# Patient Record
Sex: FEMALE | Race: WHITE | NOT HISPANIC OR LATINO | Employment: UNEMPLOYED | URBAN - METROPOLITAN AREA
[De-identification: names, ages, dates, MRNs, and addresses within clinical notes are randomized per-mention and may not be internally consistent; named-entity substitution may affect disease eponyms.]

---

## 2018-02-08 ENCOUNTER — TRANSCRIBE ORDERS (OUTPATIENT)
Dept: ADMINISTRATIVE | Facility: HOSPITAL | Age: 33
End: 2018-02-08

## 2018-02-08 ENCOUNTER — LAB (OUTPATIENT)
Dept: LAB | Facility: HOSPITAL | Age: 33
End: 2018-02-08
Attending: SURGERY

## 2018-02-08 DIAGNOSIS — D68.59 HYPERCOAGULOPATHY (HCC): ICD-10-CM

## 2018-02-08 DIAGNOSIS — Z01.818 PRE-OP TESTING: ICD-10-CM

## 2018-02-08 DIAGNOSIS — D68.59 HYPERCOAGULOPATHY (HCC): Primary | ICD-10-CM

## 2018-02-08 LAB
APTT PPP: 30.2 SECONDS (ref 22.7–35.4)
CHROMATIN AB SERPL-ACNC: <10 IU/ML (ref 0–14)
ERYTHROCYTE [SEDIMENTATION RATE] IN BLOOD: 33 MM/HR (ref 0–20)
FIBRINOGEN PPP-MCNC: 474 MG/DL (ref 219–464)
INR PPP: 1.02 (ref 0.9–1.1)
PROTHROMBIN TIME: 13.2 SECONDS (ref 11.7–14.2)

## 2018-02-08 PROCEDURE — 86148 ANTI-PHOSPHOLIPID ANTIBODY: CPT

## 2018-02-08 PROCEDURE — 86147 CARDIOLIPIN ANTIBODY EA IG: CPT

## 2018-02-08 PROCEDURE — 85300 ANTITHROMBIN III ACTIVITY: CPT

## 2018-02-08 PROCEDURE — 85732 THROMBOPLASTIN TIME PARTIAL: CPT

## 2018-02-08 PROCEDURE — 36415 COLL VENOUS BLD VENIPUNCTURE: CPT

## 2018-02-08 PROCEDURE — 85306 CLOT INHIBIT PROT S FREE: CPT

## 2018-02-08 PROCEDURE — 85384 FIBRINOGEN ACTIVITY: CPT

## 2018-02-08 PROCEDURE — 86431 RHEUMATOID FACTOR QUANT: CPT

## 2018-02-08 PROCEDURE — 85613 RUSSELL VIPER VENOM DILUTED: CPT

## 2018-02-08 PROCEDURE — 85303 CLOT INHIBIT PROT C ACTIVITY: CPT

## 2018-02-08 PROCEDURE — 83090 ASSAY OF HOMOCYSTEINE: CPT

## 2018-02-08 PROCEDURE — 86146 BETA-2 GLYCOPROTEIN ANTIBODY: CPT

## 2018-02-08 PROCEDURE — 85705 THROMBOPLASTIN INHIBITION: CPT

## 2018-02-08 PROCEDURE — 81240 F2 GENE: CPT

## 2018-02-08 PROCEDURE — 85730 THROMBOPLASTIN TIME PARTIAL: CPT

## 2018-02-08 PROCEDURE — 85670 THROMBIN TIME PLASMA: CPT

## 2018-02-08 PROCEDURE — 85305 CLOT INHIBIT PROT S TOTAL: CPT

## 2018-02-08 PROCEDURE — 81241 F5 GENE: CPT

## 2018-02-08 PROCEDURE — 85610 PROTHROMBIN TIME: CPT

## 2018-02-08 PROCEDURE — 86038 ANTINUCLEAR ANTIBODIES: CPT

## 2018-02-08 PROCEDURE — 85652 RBC SED RATE AUTOMATED: CPT

## 2018-02-09 LAB
ANA PAT SER-IMP: ABNORMAL
ANA SER QL: ABNORMAL
AT III PPP CHRO-ACNC: 105 % (ref 90–134)
CARDIOLIPIN IGA SER IA-ACNC: <9 APL U/ML (ref 0–11)
CARDIOLIPIN IGG SER IA-ACNC: <9 GPL U/ML (ref 0–14)
CARDIOLIPIN IGM SER IA-ACNC: 22 MPL U/ML (ref 0–12)
HCYS SERPL-SCNC: 7.5 UMOL/L (ref 0–15)
LA NT DPL PPP: 38.9 SEC (ref 0–55)
LA NT DPL/LA NT HPL PPP-RTO: 0.96 RATIO (ref 0–1.4)
LA NT PLATELET PPP: 34.1 SEC (ref 0–51.9)
LUPUS ANTICOAGULANT REFLEX: NORMAL
PROT C PPP-ACNC: 151 % (ref 86–163)
PROT S ACT/NOR PPP: 90 % (ref 70–127)
PROT S FREE PPP-ACNC: 64 % (ref 57–157)
PROT S PPP-ACNC: 82 % (ref 60–150)
SCREEN DRVVT: 31.6 SEC (ref 0–47)
THROMBIN TIME: 16.5 SEC (ref 0–23)

## 2018-02-11 LAB
PS IGA SER-ACNC: 1 APS IGA (ref 0–20)
PS IGG SER-ACNC: 2 GPS IGG (ref 0–11)
PS IGM SER-ACNC: 38 MPS IGM (ref 0–25)

## 2018-02-12 LAB
B2 GLYCOPROT1 IGA SER-ACNC: <9 GPI IGA UNITS (ref 0–25)
B2 GLYCOPROT1 IGG SER-ACNC: <9 GPI IGG UNITS (ref 0–20)
B2 GLYCOPROT1 IGM SER-ACNC: 12 GPI IGM UNITS (ref 0–32)
F5 GENE MUT ANL BLD/T: NORMAL
FACTOR V COMMENT: NORMAL

## 2018-02-13 LAB
F2 GENE MUT ANL BLD/T: NORMAL
Lab: NORMAL

## 2018-03-14 ENCOUNTER — CONSULT (OUTPATIENT)
Dept: ONCOLOGY | Facility: CLINIC | Age: 33
End: 2018-03-14

## 2018-03-14 ENCOUNTER — APPOINTMENT (OUTPATIENT)
Dept: OTHER | Facility: HOSPITAL | Age: 33
End: 2018-03-14

## 2018-03-14 VITALS
HEART RATE: 80 BPM | TEMPERATURE: 98.5 F | BODY MASS INDEX: 41.19 KG/M2 | RESPIRATION RATE: 16 BRPM | DIASTOLIC BLOOD PRESSURE: 82 MMHG | SYSTOLIC BLOOD PRESSURE: 120 MMHG | OXYGEN SATURATION: 100 % | HEIGHT: 64 IN | WEIGHT: 241.3 LBS

## 2018-03-14 DIAGNOSIS — R89.9 ABNORMAL LABORATORY TEST: Primary | ICD-10-CM

## 2018-03-14 DIAGNOSIS — D68.61 ANTIPHOSPHOLIPID ANTIBODY WITH HYPERCOAGULABLE STATE (HCC): ICD-10-CM

## 2018-03-14 DIAGNOSIS — I82.501 LEG DVT (DEEP VENOUS THROMBOEMBOLISM), CHRONIC, RIGHT (HCC): Primary | ICD-10-CM

## 2018-03-14 LAB
BASOPHILS # BLD AUTO: 0.06 10*3/MM3 (ref 0–0.2)
BASOPHILS NFR BLD AUTO: 0.7 % (ref 0–1.5)
DEPRECATED RDW RBC AUTO: 39.3 FL (ref 37–54)
EOSINOPHIL # BLD AUTO: 0.09 10*3/MM3 (ref 0–0.7)
EOSINOPHIL NFR BLD AUTO: 1 % (ref 0.3–6.2)
ERYTHROCYTE [DISTWIDTH] IN BLOOD BY AUTOMATED COUNT: 12.8 % (ref 11.7–13)
HCT VFR BLD AUTO: 38.8 % (ref 35.6–45.5)
HGB BLD-MCNC: 13.2 G/DL (ref 11.9–15.5)
IMM GRANULOCYTES # BLD: 0.11 10*3/MM3 (ref 0–0.03)
IMM GRANULOCYTES NFR BLD: 1.3 % (ref 0–0.5)
LYMPHOCYTES # BLD AUTO: 2.18 10*3/MM3 (ref 0.9–4.8)
LYMPHOCYTES NFR BLD AUTO: 25.2 % (ref 19.6–45.3)
MCH RBC QN AUTO: 28.9 PG (ref 26.9–32)
MCHC RBC AUTO-ENTMCNC: 34 G/DL (ref 32.4–36.3)
MCV RBC AUTO: 85.1 FL (ref 80.5–98.2)
MONOCYTES # BLD AUTO: 0.66 10*3/MM3 (ref 0.2–1.2)
MONOCYTES NFR BLD AUTO: 7.6 % (ref 5–12)
NEUTROPHILS # BLD AUTO: 5.56 10*3/MM3 (ref 1.9–8.1)
NEUTROPHILS NFR BLD AUTO: 64.2 % (ref 42.7–76)
NRBC BLD MANUAL-RTO: 0 /100 WBC (ref 0–0)
PLATELET # BLD AUTO: 253 10*3/MM3 (ref 140–500)
PMV BLD AUTO: 10.3 FL (ref 6–12)
RBC # BLD AUTO: 4.56 10*6/MM3 (ref 3.9–5.2)
WBC NRBC COR # BLD: 8.66 10*3/MM3 (ref 4.5–10.7)

## 2018-03-14 PROCEDURE — 99205 OFFICE O/P NEW HI 60 MIN: CPT | Performed by: INTERNAL MEDICINE

## 2018-03-14 PROCEDURE — 85025 COMPLETE CBC W/AUTO DIFF WBC: CPT | Performed by: INTERNAL MEDICINE

## 2018-03-14 PROCEDURE — 36415 COLL VENOUS BLD VENIPUNCTURE: CPT

## 2018-03-14 RX ORDER — MONTELUKAST SODIUM 4 MG/1
TABLET, CHEWABLE ORAL
COMMUNITY
Start: 2017-10-26 | End: 2018-06-12

## 2018-03-14 RX ORDER — SUCRALFATE 1 G/1
1 TABLET ORAL 4 TIMES DAILY
COMMUNITY
End: 2018-06-12

## 2018-03-14 RX ORDER — CHOLECALCIFEROL (VITAMIN D3) 50 MCG
2000 TABLET ORAL
COMMUNITY
Start: 2017-06-21 | End: 2018-06-12

## 2018-03-14 RX ORDER — DICYCLOMINE HYDROCHLORIDE 10 MG/1
10 CAPSULE ORAL
COMMUNITY
Start: 2017-06-12 | End: 2018-06-12

## 2018-03-14 RX ORDER — PANTOPRAZOLE SODIUM 40 MG/1
40 TABLET, DELAYED RELEASE ORAL
COMMUNITY
Start: 2018-03-05 | End: 2018-06-12

## 2018-03-14 RX ORDER — HYDROXYZINE HYDROCHLORIDE 25 MG/1
25 TABLET, FILM COATED ORAL
COMMUNITY
Start: 2017-08-29 | End: 2018-06-12

## 2018-03-14 NOTE — PROGRESS NOTES
.     REASON FOR CONSULTATION:     Provide an opinion on any further workup or treatment                             REQUESTING PHYSICIAN: Donovan Calderon MD     RECORDS OBTAINED:  Records of the patients history including those obtained from the referring provider were reviewed and summarized in detail.    HISTORY OF PRESENT ILLNESS:  The patient is a 32 y.o. year old female who is here for initial evaluation because of the newly discovered chronic DVT at the right external iliac vein and abnormal laboratory study for anti-phospholipid antibodies patient has a history of acute DVT 4 years ago when she was pregnant.       This patient has ambiguous history of lower extremity DVT. According to patient, 4 years ago when she had 4th pregnancy, she had preeclampsia and her pregnancy was terminated at 32 weeks with early delivery. She did not have previous episodes of preeclampsia for the previous 3 pregnancies. Nevertheless, according to patient, she was tested positive for D-dimer in her serum and was started on heparin. She reports no venous duplex study or CT scan for the angiogram. After childbirth, she was given Coumadin for about 2 months and then stopped. Patient reports she never had really bad leg swelling and no pains in the legs when she was diagnosed of DVT at that point.     Recently patient was seen by surgeon, Dr. Brody and had EGD examination in late 02/2018. 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 Brody is planning to do gastric bypass surgery. This patient is also moderately obese, at least. However, when Dr. Brody learned about history of suspicious DVT, she was referred to vascular surgeon, Donovan Calderon MD for further evaluation.     Dr. Calderon saw patient on 02/08/2018 and obtained venous duplex study that same day. The study reported chronic DVT in right external iliac vein; however, other veins in the right leg had no evidence for thrombosis. The left leg was completely  negative for venous thrombosis in both deep and superficial veins.     Dr. Calderon also obtained laboratory study for hypercoagulable workup on 02/08/2018. The study reported weakly positive anticardiolipin IgM at 22 units/mL and antiphosphatidylserine IgM antibody 38 units/mL but negative for the corresponding IgG and IgA in both antibodies. She was completely negative for the anti-beta-2 glycoprotein 1 antibodies. This patient also had normal antithrombin activity 105%, normal protein S activity 90% and protein C activity 151%. Her serum free protein S antigen was 64% and total protein S antigen was 82%. She had marginally elevated fibrinogen at 474 mg/dL. She was tested negative for lupus anticoagulant. This patient was also tested negative for mutation, factor V Leiden, and factor II.          Past Medical History:   Diagnosis Date   • Anxiety    • Depression    • DVT (deep vein thrombosis) in pregnancy 2013   • GERD (gastroesophageal reflux disease)    • H/O Abnormal Pap smear of cervix     4+ years ago   • Thrombophilia      Past Surgical History:   Procedure Laterality Date   • CHOLECYSTECTOMY     • COLPOSCOPY     • TUBAL ABDOMINAL LIGATION     • WISDOM TOOTH EXTRACTION         HEMATOLOGIC/ONCOLOGIC HISTORY:  (History from previous dates can be found in the separate document.)    MEDICATIONS    Current Outpatient Prescriptions:   •  Cholecalciferol (VITAMIN D) 2000 units tablet, Take 2,000 Units by mouth., Disp: , Rfl:   •  colestipol (COLESTID) 1 g tablet, TAKE 2 TABLETS BY MOUTH TWICE DAILY, Disp: , Rfl:   •  dicyclomine (BENTYL) 10 MG capsule, Take 10 mg by mouth., Disp: , Rfl:   •  hydrOXYzine (ATARAX) 25 MG tablet, Take 25 mg by mouth., Disp: , Rfl:   •  pantoprazole (PROTONIX) 40 MG EC tablet, Take 40 mg by mouth., Disp: , Rfl:   •  sucralfate (CARAFATE) 1 g tablet, Take 1 g by mouth 4 (Four) Times a Day., Disp: , Rfl:   •  apixaban (ELIQUIS) 2.5 MG tablet tablet, Take 1 tablet by mouth Every 12 (Twelve)  Hours., Disp: 60 tablet, Rfl: 11    ALLERGIES:     Allergies   Allergen Reactions   • Ciprofloxacin Shortness Of Breath and Swelling   • Oxycodone-Acetaminophen Itching, Rash, Shortness Of Breath and Swelling   • Penicillins Rash       SOCIAL HISTORY:       Social History     Social History   • Marital status:      Spouse name: Fritz    • Number of children: 4   • Years of education: High school      Occupational History   • Worked in a [employer].  Previously also worked as a        Social History Main Topics   • Smoking status: Former Smoker     Quit date: 12/12/2016   • Smokeless tobacco: Never Used   • Alcohol use Only occasionally.     • Drug use: No    • Sexual activity: Not on file         FAMILY HISTORY:  Family History   Problem Relation Age of Onset   • Breast cancer Maternal Grandmother    • Depression Maternal Grandmother    • Heart disease Maternal Grandmother    • Cancer Maternal Grandfather    • Brain cancer Paternal Grandmother    • Breast cancer Paternal Grandmother        REVIEW OF SYSTEMS:  Review of Systems   Constitutional: Positive for fatigue. Negative for chills and fever.        Excessive fatigue and night sweats no fever.  Patient complains weight gains and excessive thirst.   HENT: Negative for congestion, hearing loss, rhinorrhea, sore throat and trouble swallowing.    Eyes: Negative for discharge.        Poor vision   Respiratory: Positive for shortness of breath. Negative for cough, chest tightness and wheezing.         Exertional dyspnea   Cardiovascular: Positive for chest pain, palpitations and leg swelling.   Gastrointestinal: Positive for abdominal pain, diarrhea, nausea and rectal pain. Negative for abdominal distention, anal bleeding, blood in stool and vomiting.   Endocrine: Negative for cold intolerance and heat intolerance.   Genitourinary: Positive for frequency. Negative for dysuria, hematuria, menstrual problem, urgency and vaginal bleeding.  "  Musculoskeletal: Positive for arthralgias. Negative for back pain.   Skin: Negative.    Neurological: Positive for dizziness and headaches. Negative for syncope.   Hematological: Negative for adenopathy. Does not bruise/bleed easily.   Psychiatric/Behavioral: Negative for behavioral problems, hallucinations and sleep disturbance.              Vitals:    03/14/18 1511   BP: 120/82   Pulse: 80   Resp: 16   Temp: 98.5 °F (36.9 °C)   TempSrc: Oral   SpO2: 100%   Weight: 109 kg (241 lb 4.8 oz)   Height: 163 cm (64.17\")   PainSc: 10-Worst pain ever   PainLoc: Head     Current Status 3/14/2018   ECOG score 0      PHYSICAL EXAM:      CONSTITUTIONALWell-developed, morbid obese female BMI 41.2  No distress, looks comfortable.  EYES:  Conjunctiva and lids unremarkable.  PERRLA  EARS,NOSE,MOUTH,THROAT:  Ears and nose appear unremarkable.  Lips and gums appear unremarkable.  RESPIRATORY:  Normal respiratory effort.  Lungs clear to auscultation bilaterally.  CARDIOVASCULAR:  Normal S1, S2.  No murmurs rubs or gallops.  No significant lower extremity edema.  GASTROINTESTINAL: obese, Abdomen appears unremarkable.  Nontender.  No hepatomegaly.  No splenomegaly.  LYMPHATIC:  No cervical, supraclavicular, axillary lymphadenopathy.   MUSCULOSKELETAL:  Unremarkable gait.  Unremarkable digits/nails.  No cyanosis or clubbing.  Some discomfort at the right groin area, however no lymphadenopathy.  SKIN:  Warm.  No rashes.  Patient has tattoos all over the body.    PSYCHIATRIC:  Normal judgment and insight.  Normal mood and affect.      RECENT LABS:        WBC   Date Value Ref Range Status   03/14/2018 8.66 4.50 - 10.70 10*3/mm3 Final     Hemoglobin   Date Value Ref Range Status   03/14/2018 13.2 11.9 - 15.5 g/dL Final     Platelets   Date Value Ref Range Status   03/14/2018 253 140 - 500 10*3/mm3 Final       Assessment/Plan      Right Leg chronic DVT (deep venous thromboembolism) in the external iliac vein discovered on 2/8/2018.  With " her history, I would rather believe she had acute DVT 4 years ago during the third trimester of her pregnancy.  She had preeclampsia at that time and had a early delivery at a 32 week gestation age.  However she did not have venous Doppler study at that time according to patient.  He was in the hospital in [place].  Nevertheless she was given a fraction a heparin and then subsequently switched her to Coumadin anticoagulation for 2 more months.      Venous Doppler study this time on 2/8/2018 reported chronic DVT in the right external iliac vein.  She also had laboratory study reported slightly elevated anticardiolipin IgM and also anti-phosphatidylserine IgM antibodies but no other antibodies in the antiphospholipid antibody family.  She had normal protein C and protein S activity, no evidence of mutation for both factor V Leiden and factor II.  I discussed with patient that we'll repeat her laboratory study for the anti-cardiolipin antibodies and also anti-phosphatidylserine antibodies in 3 months.  Nevertheless I do recommend starting low-dose Eliquis 2.5 mg every 12 hours.  This will be on hold 48 hours prior to her surgical procedure.      I agree with Dr. Calderon's assessment that the IVC filter even it is to be placed, it will be a temporary measure.  Need to be removed, otherwise it becomes thrombogenic in the long run and will be harmful for this patient.        PLAN:   1.  Start low-dose Eliquis 2.5 mg every 12 hours.  I e-scribed to her pharmacy.  Eliquis needs to be hold 48 hours prior to surgical procedure.  2.  Patient will follow-up with vascular surgeon Dr. Calderon for further vascular study and possible IVC filter placement.   3.  Patient will follow-up with Dr. Brody for gastric bypass.  Could consider short term low-dose Lovenox 40 mg subcutaneous every 12 hours (1 week) for DVT prophylaxis up to one week after bypass surgery because possible nothing by mouth after the surgery.  4.  Repeat laboratory  studies in 3 months as listed below, one week prior to her next M.D. visit here.   - Anticardiolipin Antibody, IgG / M, Qn  - Beta-2 Glycoprotein Antibodies  - Phosphatidylserine Antibodies  - CBC & Differential      FUNMILAYO CAR M.D., Ph.D.    3/14/2018        CC:   MD Conner Alvarez M.D.     Dictated using Dragon Dictation.

## 2018-06-05 ENCOUNTER — LAB (OUTPATIENT)
Dept: LAB | Facility: HOSPITAL | Age: 33
End: 2018-06-05

## 2018-06-05 DIAGNOSIS — I82.501 LEG DVT (DEEP VENOUS THROMBOEMBOLISM), CHRONIC, RIGHT (HCC): ICD-10-CM

## 2018-06-05 LAB
BASOPHILS # BLD AUTO: 0.03 10*3/MM3 (ref 0–0.1)
BASOPHILS NFR BLD AUTO: 0.4 % (ref 0–1.1)
DEPRECATED RDW RBC AUTO: 41.7 FL (ref 37–49)
EOSINOPHIL # BLD AUTO: 0.09 10*3/MM3 (ref 0–0.36)
EOSINOPHIL NFR BLD AUTO: 1.3 % (ref 1–5)
ERYTHROCYTE [DISTWIDTH] IN BLOOD BY AUTOMATED COUNT: 13.4 % (ref 11.7–14.5)
HCT VFR BLD AUTO: 36.2 % (ref 34–45)
HGB BLD-MCNC: 11.4 G/DL (ref 11.5–14.9)
IMM GRANULOCYTES # BLD: 0.03 10*3/MM3 (ref 0–0.03)
IMM GRANULOCYTES NFR BLD: 0.4 % (ref 0–0.5)
LYMPHOCYTES # BLD AUTO: 1.35 10*3/MM3 (ref 1–3.5)
LYMPHOCYTES NFR BLD AUTO: 19.5 % (ref 20–49)
MCH RBC QN AUTO: 27.3 PG (ref 27–33)
MCHC RBC AUTO-ENTMCNC: 31.5 G/DL (ref 32–35)
MCV RBC AUTO: 86.8 FL (ref 83–97)
MONOCYTES # BLD AUTO: 0.61 10*3/MM3 (ref 0.25–0.8)
MONOCYTES NFR BLD AUTO: 8.8 % (ref 4–12)
NEUTROPHILS # BLD AUTO: 4.81 10*3/MM3 (ref 1.5–7)
NEUTROPHILS NFR BLD AUTO: 69.6 % (ref 39–75)
NRBC BLD MANUAL-RTO: 0 /100 WBC (ref 0–0)
PLATELET # BLD AUTO: 184 10*3/MM3 (ref 150–375)
PMV BLD AUTO: 11 FL (ref 8.9–12.1)
RBC # BLD AUTO: 4.17 10*6/MM3 (ref 3.9–5)
WBC NRBC COR # BLD: 6.92 10*3/MM3 (ref 4–10)

## 2018-06-05 PROCEDURE — 36415 COLL VENOUS BLD VENIPUNCTURE: CPT | Performed by: INTERNAL MEDICINE

## 2018-06-05 PROCEDURE — 85025 COMPLETE CBC W/AUTO DIFF WBC: CPT | Performed by: INTERNAL MEDICINE

## 2018-06-06 LAB
CARDIOLIPIN IGG SER IA-ACNC: <9 GPL U/ML (ref 0–14)
CARDIOLIPIN IGM SER IA-ACNC: 20 MPL U/ML (ref 0–12)

## 2018-06-07 LAB
B2 GLYCOPROT1 IGA SER-ACNC: <9 GPI IGA UNITS (ref 0–25)
B2 GLYCOPROT1 IGG SER-ACNC: <9 GPI IGG UNITS (ref 0–20)
B2 GLYCOPROT1 IGM SER-ACNC: 9 GPI IGM UNITS (ref 0–32)
PS IGA SER-ACNC: 1 APS IGA (ref 0–20)
PS IGG SER-ACNC: 3 GPS IGG (ref 0–11)
PS IGM SER-ACNC: 25 MPS IGM (ref 0–25)

## 2018-06-12 ENCOUNTER — OFFICE VISIT (OUTPATIENT)
Dept: ONCOLOGY | Facility: CLINIC | Age: 33
End: 2018-06-12

## 2018-06-12 ENCOUNTER — APPOINTMENT (OUTPATIENT)
Dept: LAB | Facility: HOSPITAL | Age: 33
End: 2018-06-12

## 2018-06-12 VITALS
TEMPERATURE: 98.5 F | DIASTOLIC BLOOD PRESSURE: 70 MMHG | SYSTOLIC BLOOD PRESSURE: 120 MMHG | WEIGHT: 206.8 LBS | RESPIRATION RATE: 16 BRPM | OXYGEN SATURATION: 98 % | HEIGHT: 64 IN | HEART RATE: 78 BPM | BODY MASS INDEX: 35.3 KG/M2

## 2018-06-12 DIAGNOSIS — I82.501 LEG DVT (DEEP VENOUS THROMBOEMBOLISM), CHRONIC, RIGHT (HCC): ICD-10-CM

## 2018-06-12 DIAGNOSIS — D68.61 ANTIPHOSPHOLIPID ANTIBODY WITH HYPERCOAGULABLE STATE (HCC): ICD-10-CM

## 2018-06-12 DIAGNOSIS — D64.9 ANEMIA, UNSPECIFIED TYPE: Primary | ICD-10-CM

## 2018-06-12 PROCEDURE — 99214 OFFICE O/P EST MOD 30 MIN: CPT | Performed by: INTERNAL MEDICINE

## 2018-06-12 PROCEDURE — G0463 HOSPITAL OUTPT CLINIC VISIT: HCPCS | Performed by: INTERNAL MEDICINE

## 2018-06-12 RX ORDER — FERROUS SULFATE 325(65) MG
325 TABLET ORAL
COMMUNITY

## 2018-06-12 RX ORDER — OMEPRAZOLE 40 MG/1
40 CAPSULE, DELAYED RELEASE ORAL DAILY
COMMUNITY

## 2018-06-12 NOTE — PROGRESS NOTES
.     REASON FOR CONSULTATION:     Right Leg chronic DVT (deep venous thromboembolism) in the external iliac vein discovered on 2/8/2018.  Patient is on low-dose Eliquis 2.5 mg twice a day.                                   HISTORY OF PRESENT ILLNESS:  The patient is a 32 y.o. year old female who is here for 3-month re-evaluation because of chronic DVT at the right external iliac vein and abnormal laboratory study for anti-phospholipid antibodies.   patient has a history of acute DVT in 2014 when she was pregnant.       We saw patient in March 2018 and started her on low-dose Eliquis 2.5 mg every 12 hours.     Patient reports she is doing well, no bleeding no bruising.  Her repeated a venous Doppler study reported resolution of thrombosis on 4/5/2018.  I reviewed Dr. Calderon's office note.    Patient also reports she had gastric bypass surgery done on May 1, 2018.     We repeated laboratory study on 6/5/2018, she still had a marginally elevated anticardiolipin IgM antibody but negative for the IgG and IgA subtype, and she was also tested negative for anti-phosphatidylserine IgM antibody this time, as well as IgG and IgA subtype, and also negative for anti-beta-2 glycoprotein 1 antibodies.    On 6/5/2018, She was mildly anemic with a hemoglobin 11.4.  Has normal platelets 184,000 and WBC 6900 including neutrophils 4800 lymphocytes 1300.     Past Medical History:   Diagnosis Date   • Anemia    • Anxiety    • Depression    • DVT (deep vein thrombosis) in pregnancy 2013   • GERD (gastroesophageal reflux disease)    • H/O Abnormal Pap smear of cervix     4+ years ago   • Tattoos    • Thrombophilia      Past Surgical History:   Procedure Laterality Date   • CHOLECYSTECTOMY  2009   • COLPOSCOPY     • TUBAL ABDOMINAL LIGATION     • WISDOM TOOTH EXTRACTION     Gastric bypass in early May 2018.      HEMATOLOGIC/ONCOLOGIC HISTORY:  The patient is a 32 y.o. year old female who is here for initial evaluation because of the  newly discovered chronic DVT at the right external iliac vein and abnormal laboratory study for anti-phospholipid antibodies patient has a history of acute DVT 4 years ago when she was pregnant.       This patient has ambiguous history of lower extremity DVT. According to patient, 4 years ago when she had 4th pregnancy, she had preeclampsia and her pregnancy was terminated at 32 weeks with early delivery. She did not have previous episodes of preeclampsia for the previous 3 pregnancies. Nevertheless, according to patient, she was tested positive for D-dimer in her serum and was started on heparin. She reports no venous duplex study or CT scan for the angiogram. After childbirth, she was given Coumadin for about 2 months and then stopped. Patient reports she never had really bad leg swelling and no pains in the legs when she was diagnosed of DVT at that point.     Recently patient was seen by surgeon, Dr. Brody and had EGD examination in late 02/2018. 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 Brody is planning to do gastric bypass surgery. This patient is also moderately obese, at least. However, when Dr. Brody learned about history of suspicious DVT, she was referred to vascular surgeon, Donovan Calderon MD for further evaluation.     Dr. Calderon saw patient on 02/08/2018 and obtained venous duplex study that same day. The study reported chronic DVT in right external iliac vein; however, other veins in the right leg had no evidence for thrombosis. The left leg was completely negative for venous thrombosis in both deep and superficial veins.     Dr. Calderon also obtained laboratory study for hypercoagulable workup on 02/08/2018. The study reported weakly positive anticardiolipin IgM at 22 units/mL and antiphosphatidylserine IgM antibody 38 units/mL but negative for the corresponding IgG and IgA in both antibodies. She was completely negative for the anti-beta-2 glycoprotein 1 antibodies. This patient also  had normal antithrombin activity 105%, normal protein S activity 90% and protein C activity 151%. Her serum free protein S antigen was 64% and total protein S antigen was 82%. She had marginally elevated fibrinogen at 474 mg/dL. She was tested negative for lupus anticoagulant. This patient was also tested negative for mutation, factor V Leiden, and factor II.    Patient was started on low-dose Eliquis 2.5 mg every 12 hours on 3/14/2018.      MEDICATIONS    Current Outpatient Prescriptions:   •  Calcium 500-100 MG-UNIT chewable tablet, Chew 1 tablet Every 4 (Four) Hours As Needed., Disp: , Rfl:   •  Cyanocobalamin (VITAMIN B-12 IJ), Inject  as directed., Disp: , Rfl:   •  ferrous sulfate 325 (65 FE) MG tablet, Take 325 mg by mouth Daily With Breakfast., Disp: , Rfl:   •  Multiple Vitamin (MULTI VITAMIN DAILY PO), Take  by mouth., Disp: , Rfl:   •  omeprazole (priLOSEC) 40 MG capsule, Take 40 mg by mouth Daily., Disp: , Rfl:   •  apixaban (ELIQUIS) 2.5 MG tablet tablet, Take 1 tablet by mouth Every 12 (Twelve) Hours., Disp: 60 tablet, Rfl: 11    ALLERGIES:     Allergies   Allergen Reactions   • Ciprofloxacin Shortness Of Breath and Swelling   • Oxycodone-Acetaminophen Itching, Rash, Shortness Of Breath and Swelling   • Penicillins Rash       SOCIAL HISTORY:       Social History     Social History   • Marital status:      Spouse name: Fritz    • Number of children: 4   • Years of education: High school      Occupational History   • Worked in a [employer].  Previously also worked as a        Social History Main Topics   • Smoking status: Former Smoker     Quit date: 12/12/2016   • Smokeless tobacco: Never Used   • Alcohol use Only occasionally.     • Drug use: No    • Sexual activity: Not on file         FAMILY HISTORY:  Family History   Problem Relation Age of Onset   • Breast cancer Maternal Grandmother    • Depression Maternal Grandmother    • Heart disease Maternal Grandmother    • Cancer Maternal  "Grandfather    • Brain cancer Paternal Grandmother    • Breast cancer Paternal Grandmother        REVIEW OF SYSTEMS:  Review of Systems   Constitutional: Positive for fatigue. Negative for chills and fever.        Excessive fatigue and night sweats no fever.  Patient complains weight gains and excessive thirst.   HENT: Negative for congestion, hearing loss, rhinorrhea, sore throat and trouble swallowing.    Eyes: Negative for discharge.        Poor vision   Respiratory: Positive for shortness of breath. Negative for cough, chest tightness and wheezing.         Exertional dyspnea   Cardiovascular: Positive for chest pain, palpitations and leg swelling.   Gastrointestinal: Positive for abdominal pain, diarrhea, nausea and rectal pain. Negative for abdominal distention, anal bleeding, blood in stool and vomiting.   Endocrine: Negative for cold intolerance and heat intolerance.   Genitourinary: Positive for frequency. Negative for dysuria, hematuria, menstrual problem, urgency and vaginal bleeding.   Musculoskeletal: Positive for arthralgias. Negative for back pain.   Skin: Negative.    Neurological: Positive for dizziness and headaches. Negative for syncope.   Hematological: Negative for adenopathy. Does not bruise/bleed easily.   Psychiatric/Behavioral: Negative for behavioral problems, hallucinations and sleep disturbance.              Vitals:    06/12/18 1010   BP: 120/70   Pulse: 78   Resp: 16   Temp: 98.5 °F (36.9 °C)   SpO2: 98%  Comment: at rest   Weight: 93.8 kg (206 lb 12.8 oz)   Height: 163 cm (64.17\")   PainSc: 8  Comment: joints     Current Status 6/12/2018   ECOG score 1      PHYSICAL EXAM:      CONSTITUTIONAL: Well-developed,  mild obese female BMI 35.3,  No distress, looks comfortable.  EYES:  Conjunctiva and lids unremarkable.   EARS,NOSE,MOUTH,THROAT:  Ears and nose appear unremarkable.  Lips appear unremarkable.  RESPIRATORY: Lungs clear to auscultation bilaterally.  CARDIOVASCULAR:  Normal S1, S2.  " No murmurs rubs or gallops.    GASTROINTESTINAL:  Nontender.  No hepatomegaly.  No splenomegaly.  Bowel sounds normal.    LYMPHATIC:  No cervical, supraclavicular, axillary lymphadenopathy.   MUSCULOSKELETAL:  Unremarkable gait.  No cyanosis or clubbing.  No significant lower extremity edema.  SKIN:  Warm.  No rashes.  Patient has tattoos all over the body.    PSYCHIATRIC:  Normal judgment and insight.  Normal mood and affect.      RECENT LABS:        WBC   Date Value Ref Range Status   06/05/2018 6.92 4.00 - 10.00 10*3/mm3 Final   03/14/2018 8.66 4.50 - 10.70 10*3/mm3 Final     Hemoglobin   Date Value Ref Range Status   06/05/2018 11.4 (L) 11.5 - 14.9 g/dL Final   03/14/2018 13.2 11.9 - 15.5 g/dL Final     Platelets   Date Value Ref Range Status   06/05/2018 184 150 - 375 10*3/mm3 Final   03/14/2018 253 140 - 500 10*3/mm3 Final       Assessment/Plan      1. Right Leg chronic DVT (deep venous thromboembolism) in the external iliac vein discovered on 2/8/2018.  This patient possibly has antiphospholipid antibody syndrome.  Venous Doppler study this time on 2/8/2018 reported chronic DVT in the right external iliac vein.  She also had laboratory study reported slightly elevated anticardiolipin IgM and also anti-phosphatidylserine IgM antibodies but no other antibodies in the antiphospholipid antibody family.  She had normal protein C and protein S activity, no evidence of mutation for both factor V Leiden and factor II.      We started patient on low-dose Eliquis 2.5 mg every 12 hours in middle March 2018.  She tolerated therapy very well.  This patient had a repeated a venous Doppler study on 4/5/2018 which showed resolution of thrombosis.      She will continue Eliquis for now.     2.  Obesity status post gastric bypass in early May 2018.  Patient already lost about 30 pounds since surgery.  She did not have IVC filter placed.  Patient already has be on vitamin B12 injection.  She also takes oral iron.    3.  Mild  anemia.  Possibly related to her recent surgery.  Nevertheless I think this patient would eventually develop iron deficiency anemia as almost all the patient had gastric bypass.         PLAN:   1.  Continue low-dose Eliquis 2.5 mg every 12 hours.   2.  Patient will return in 3 months for M.D. evaluation, with CBC, iron profile and ferritin level.       FUNMILAYO CAR M.D., Ph.D.    6/12/2018      CC:   MD Conner Alvarez M.D.     Dictated using Dragon Dictation.

## 2018-07-05 ENCOUNTER — TELEPHONE (OUTPATIENT)
Dept: ONCOLOGY | Facility: HOSPITAL | Age: 33
End: 2018-07-05

## 2018-07-05 NOTE — TELEPHONE ENCOUNTER
Received message from [facility] in obstetrics and gynecology.  Message sent to Dr. Cary:    Dr. Cary, patient needs to have a total laparoscopic hysterectomy, bilateral salpingectomy and cystocopy.  They need written recommendations for holding eliquis before and after surgery.  Let me know what you recommend. Thanks.     Will fax to [phone]

## 2018-12-12 ENCOUNTER — APPOINTMENT (OUTPATIENT)
Dept: ONCOLOGY | Facility: CLINIC | Age: 33
End: 2018-12-12

## 2018-12-12 ENCOUNTER — APPOINTMENT (OUTPATIENT)
Dept: LAB | Facility: HOSPITAL | Age: 33
End: 2018-12-12

## 2019-01-25 ENCOUNTER — APPOINTMENT (OUTPATIENT)
Dept: ONCOLOGY | Facility: CLINIC | Age: 34
End: 2019-01-25

## 2019-01-25 ENCOUNTER — APPOINTMENT (OUTPATIENT)
Dept: LAB | Facility: HOSPITAL | Age: 34
End: 2019-01-25

## 2019-01-28 ENCOUNTER — LAB (OUTPATIENT)
Dept: LAB | Facility: HOSPITAL | Age: 34
End: 2019-01-28

## 2019-01-28 ENCOUNTER — OFFICE VISIT (OUTPATIENT)
Dept: ONCOLOGY | Facility: CLINIC | Age: 34
End: 2019-01-28

## 2019-01-28 VITALS
DIASTOLIC BLOOD PRESSURE: 69 MMHG | RESPIRATION RATE: 14 BRPM | HEIGHT: 64 IN | HEART RATE: 64 BPM | OXYGEN SATURATION: 99 % | WEIGHT: 136.7 LBS | TEMPERATURE: 98.6 F | SYSTOLIC BLOOD PRESSURE: 108 MMHG | BODY MASS INDEX: 23.34 KG/M2

## 2019-01-28 DIAGNOSIS — T45.4X5A ADVERSE EFFECT OF IRON, INITIAL ENCOUNTER: ICD-10-CM

## 2019-01-28 DIAGNOSIS — I82.501 LEG DVT (DEEP VENOUS THROMBOEMBOLISM), CHRONIC, RIGHT (HCC): ICD-10-CM

## 2019-01-28 DIAGNOSIS — Z98.84 HISTORY OF GASTRIC BYPASS: ICD-10-CM

## 2019-01-28 DIAGNOSIS — D50.9 IRON DEFICIENCY ANEMIA, UNSPECIFIED IRON DEFICIENCY ANEMIA TYPE: Primary | ICD-10-CM

## 2019-01-28 DIAGNOSIS — R59.9 LYMPH NODE ENLARGEMENT: ICD-10-CM

## 2019-01-28 DIAGNOSIS — D68.61 ANTIPHOSPHOLIPID ANTIBODY WITH HYPERCOAGULABLE STATE (HCC): ICD-10-CM

## 2019-01-28 DIAGNOSIS — D64.9 ANEMIA, UNSPECIFIED TYPE: ICD-10-CM

## 2019-01-28 LAB
ALBUMIN SERPL-MCNC: 4.2 G/DL (ref 3.5–5.2)
ALBUMIN/GLOB SERPL: 1.5 G/DL (ref 1.1–2.4)
ALP SERPL-CCNC: 119 U/L (ref 38–116)
ALT SERPL W P-5'-P-CCNC: 47 U/L (ref 0–33)
ANION GAP SERPL CALCULATED.3IONS-SCNC: 13.7 MMOL/L
AST SERPL-CCNC: 27 U/L (ref 0–32)
BASOPHILS # BLD AUTO: 0.03 10*3/MM3 (ref 0–0.1)
BASOPHILS NFR BLD AUTO: 0.6 % (ref 0–1.1)
BILIRUB SERPL-MCNC: 0.3 MG/DL (ref 0.1–1.2)
BUN BLD-MCNC: 6 MG/DL (ref 6–20)
BUN/CREAT SERPL: 9.7 (ref 7.3–30)
CALCIUM SPEC-SCNC: 9.3 MG/DL (ref 8.5–10.2)
CHLORIDE SERPL-SCNC: 104 MMOL/L (ref 98–107)
CO2 SERPL-SCNC: 21.3 MMOL/L (ref 22–29)
CREAT BLD-MCNC: 0.62 MG/DL (ref 0.6–1.1)
DEPRECATED RDW RBC AUTO: 45.2 FL (ref 37–49)
EOSINOPHIL # BLD AUTO: 0.03 10*3/MM3 (ref 0–0.36)
EOSINOPHIL NFR BLD AUTO: 0.6 % (ref 1–5)
ERYTHROCYTE [DISTWIDTH] IN BLOOD BY AUTOMATED COUNT: 13.2 % (ref 11.7–14.5)
FERRITIN SERPL-MCNC: 22.6 NG/ML (ref 11–207)
GFR SERPL CREATININE-BSD FRML MDRD: 112 ML/MIN/1.73
GLOBULIN UR ELPH-MCNC: 2.8 GM/DL (ref 1.8–3.5)
GLUCOSE BLD-MCNC: 97 MG/DL (ref 74–124)
HCT VFR BLD AUTO: 35.9 % (ref 34–45)
HGB BLD-MCNC: 11.7 G/DL (ref 11.5–14.9)
IMM GRANULOCYTES # BLD AUTO: 0.02 10*3/MM3 (ref 0–0.03)
IMM GRANULOCYTES NFR BLD AUTO: 0.4 % (ref 0–0.5)
IRON 24H UR-MRATE: 92 MCG/DL (ref 37–145)
IRON SATN MFR SERPL: 24 % (ref 14–48)
LDH SERPL-CCNC: 157 U/L (ref 99–259)
LYMPHOCYTES # BLD AUTO: 1.46 10*3/MM3 (ref 1–3.5)
LYMPHOCYTES NFR BLD AUTO: 31.4 % (ref 20–49)
MCH RBC QN AUTO: 30.5 PG (ref 27–33)
MCHC RBC AUTO-ENTMCNC: 32.6 G/DL (ref 32–35)
MCV RBC AUTO: 93.5 FL (ref 83–97)
MONOCYTES # BLD AUTO: 0.42 10*3/MM3 (ref 0.25–0.8)
MONOCYTES NFR BLD AUTO: 9 % (ref 4–12)
NEUTROPHILS # BLD AUTO: 2.69 10*3/MM3 (ref 1.5–7)
NEUTROPHILS NFR BLD AUTO: 58 % (ref 39–75)
NRBC BLD AUTO-RTO: 0 /100 WBC (ref 0–0)
PLATELET # BLD AUTO: 175 10*3/MM3 (ref 150–375)
PMV BLD AUTO: 10.3 FL (ref 8.9–12.1)
POTASSIUM BLD-SCNC: 3.6 MMOL/L (ref 3.5–4.7)
PROT SERPL-MCNC: 7 G/DL (ref 6.3–8)
RBC # BLD AUTO: 3.84 10*6/MM3 (ref 3.9–5)
SODIUM BLD-SCNC: 139 MMOL/L (ref 134–145)
TIBC SERPL-MCNC: 391 MCG/DL (ref 249–505)
TRANSFERRIN SERPL-MCNC: 279 MG/DL (ref 200–360)
WBC NRBC COR # BLD: 4.65 10*3/MM3 (ref 4–10)

## 2019-01-28 PROCEDURE — 80053 COMPREHEN METABOLIC PANEL: CPT | Performed by: INTERNAL MEDICINE

## 2019-01-28 PROCEDURE — 82728 ASSAY OF FERRITIN: CPT | Performed by: INTERNAL MEDICINE

## 2019-01-28 PROCEDURE — 83540 ASSAY OF IRON: CPT | Performed by: INTERNAL MEDICINE

## 2019-01-28 PROCEDURE — 99215 OFFICE O/P EST HI 40 MIN: CPT | Performed by: INTERNAL MEDICINE

## 2019-01-28 PROCEDURE — 36415 COLL VENOUS BLD VENIPUNCTURE: CPT | Performed by: INTERNAL MEDICINE

## 2019-01-28 PROCEDURE — 84466 ASSAY OF TRANSFERRIN: CPT | Performed by: INTERNAL MEDICINE

## 2019-01-28 PROCEDURE — 85025 COMPLETE CBC W/AUTO DIFF WBC: CPT | Performed by: INTERNAL MEDICINE

## 2019-01-28 PROCEDURE — 83615 LACTATE (LD) (LDH) ENZYME: CPT | Performed by: INTERNAL MEDICINE

## 2019-01-31 ENCOUNTER — HOSPITAL ENCOUNTER (OUTPATIENT)
Dept: PET IMAGING | Facility: HOSPITAL | Age: 34
Discharge: HOME | End: 2019-01-31
Attending: INTERNAL MEDICINE | Admitting: INTERNAL MEDICINE

## 2019-01-31 DIAGNOSIS — R59.9 LYMPH NODE ENLARGEMENT: ICD-10-CM

## 2019-01-31 LAB — CREAT BLDA-MCNC: 0.7 MG/DL (ref 0.6–1.3)

## 2019-01-31 PROCEDURE — 74177 CT ABD & PELVIS W/CONTRAST: CPT

## 2019-01-31 PROCEDURE — 0 DIATRIZOATE MEGLUMINE & SODIUM PER 1 ML: Performed by: INTERNAL MEDICINE

## 2019-01-31 PROCEDURE — 70491 CT SOFT TISSUE NECK W/DYE: CPT

## 2019-01-31 PROCEDURE — 71260 CT THORAX DX C+: CPT

## 2019-01-31 PROCEDURE — 25010000002 IOPAMIDOL 61 % SOLUTION: Performed by: INTERNAL MEDICINE

## 2019-01-31 PROCEDURE — 82565 ASSAY OF CREATININE: CPT

## 2019-01-31 RX ADMIN — DIATRIZOATE MEGLUMINE AND DIATRIZOATE SODIUM 30 ML: 660; 100 LIQUID ORAL; RECTAL at 08:07

## 2019-01-31 RX ADMIN — IOPAMIDOL 85 ML: 612 INJECTION, SOLUTION INTRAVENOUS at 08:54

## 2019-02-06 RX ORDER — PROCHLORPERAZINE MALEATE 10 MG
10 TABLET ORAL ONCE
Status: CANCELLED
Start: 2019-02-08 | End: 2019-02-08

## 2019-02-06 RX ORDER — PROCHLORPERAZINE MALEATE 10 MG
10 TABLET ORAL ONCE
Status: CANCELLED
Start: 2019-02-15 | End: 2019-02-15

## 2019-02-06 RX ORDER — SODIUM CHLORIDE 9 MG/ML
250 INJECTION, SOLUTION INTRAVENOUS ONCE
Status: CANCELLED | OUTPATIENT
Start: 2019-02-15

## 2019-02-06 RX ORDER — SODIUM CHLORIDE 9 MG/ML
250 INJECTION, SOLUTION INTRAVENOUS ONCE
Status: CANCELLED | OUTPATIENT
Start: 2019-02-08

## 2019-02-08 ENCOUNTER — INFUSION (OUTPATIENT)
Dept: ONCOLOGY | Facility: HOSPITAL | Age: 34
End: 2019-02-08

## 2019-02-08 VITALS
DIASTOLIC BLOOD PRESSURE: 74 MMHG | RESPIRATION RATE: 16 BRPM | SYSTOLIC BLOOD PRESSURE: 107 MMHG | OXYGEN SATURATION: 95 % | TEMPERATURE: 98.3 F | BODY MASS INDEX: 23.22 KG/M2 | HEART RATE: 62 BPM | WEIGHT: 136 LBS

## 2019-02-08 DIAGNOSIS — T45.4X5A ADVERSE EFFECT OF IRON, INITIAL ENCOUNTER: ICD-10-CM

## 2019-02-08 DIAGNOSIS — D50.9 IRON DEFICIENCY ANEMIA, UNSPECIFIED IRON DEFICIENCY ANEMIA TYPE: Primary | ICD-10-CM

## 2019-02-08 PROCEDURE — 25010000002 FERRIC CARBOXYMALTOSE 750 MG/15ML SOLUTION 15 ML VIAL: Performed by: INTERNAL MEDICINE

## 2019-02-08 PROCEDURE — 96374 THER/PROPH/DIAG INJ IV PUSH: CPT | Performed by: INTERNAL MEDICINE

## 2019-02-08 PROCEDURE — 63710000001 PROCHLORPERAZINE MALEATE PER 10 MG: Performed by: INTERNAL MEDICINE

## 2019-02-08 RX ORDER — SODIUM CHLORIDE 9 MG/ML
250 INJECTION, SOLUTION INTRAVENOUS ONCE
Status: COMPLETED | OUTPATIENT
Start: 2019-02-08 | End: 2019-02-08

## 2019-02-08 RX ORDER — PROCHLORPERAZINE MALEATE 10 MG
10 TABLET ORAL ONCE
Status: COMPLETED | OUTPATIENT
Start: 2019-02-08 | End: 2019-02-08

## 2019-02-08 RX ADMIN — SODIUM CHLORIDE 250 ML: 9 INJECTION, SOLUTION INTRAVENOUS at 08:09

## 2019-02-08 RX ADMIN — PROCHLORPERAZINE MALEATE 10 MG: 10 TABLET, FILM COATED ORAL at 08:09

## 2019-02-08 RX ADMIN — FERRIC CARBOXYMALTOSE INJECTION 750 MG: 50 INJECTION, SOLUTION INTRAVENOUS at 08:16

## 2019-02-15 ENCOUNTER — OFFICE VISIT (OUTPATIENT)
Dept: ONCOLOGY | Facility: CLINIC | Age: 34
End: 2019-02-15

## 2019-02-15 ENCOUNTER — INFUSION (OUTPATIENT)
Dept: ONCOLOGY | Facility: HOSPITAL | Age: 34
End: 2019-02-15

## 2019-02-15 ENCOUNTER — LAB (OUTPATIENT)
Dept: ONCOLOGY | Facility: HOSPITAL | Age: 34
End: 2019-02-15

## 2019-02-15 VITALS
BODY MASS INDEX: 23.03 KG/M2 | SYSTOLIC BLOOD PRESSURE: 112 MMHG | HEIGHT: 64 IN | OXYGEN SATURATION: 100 % | TEMPERATURE: 98.2 F | RESPIRATION RATE: 14 BRPM | HEART RATE: 82 BPM | WEIGHT: 134.9 LBS | DIASTOLIC BLOOD PRESSURE: 68 MMHG

## 2019-02-15 DIAGNOSIS — I82.501 LEG DVT (DEEP VENOUS THROMBOEMBOLISM), CHRONIC, RIGHT (HCC): ICD-10-CM

## 2019-02-15 DIAGNOSIS — R59.9 LYMPH NODE ENLARGEMENT: ICD-10-CM

## 2019-02-15 DIAGNOSIS — D64.9 ANEMIA, UNSPECIFIED TYPE: ICD-10-CM

## 2019-02-15 DIAGNOSIS — Z98.84 HISTORY OF GASTRIC BYPASS: ICD-10-CM

## 2019-02-15 DIAGNOSIS — E55.9 VITAMIN D DEFICIENCY, UNSPECIFIED: Primary | ICD-10-CM

## 2019-02-15 DIAGNOSIS — T45.4X5A ADVERSE EFFECT OF IRON, INITIAL ENCOUNTER: ICD-10-CM

## 2019-02-15 DIAGNOSIS — D68.61 ANTIPHOSPHOLIPID ANTIBODY WITH HYPERCOAGULABLE STATE (HCC): Primary | ICD-10-CM

## 2019-02-15 DIAGNOSIS — R91.1 PULMONARY NODULE: ICD-10-CM

## 2019-02-15 DIAGNOSIS — D50.8 IRON DEFICIENCY ANEMIA SECONDARY TO INADEQUATE DIETARY IRON INTAKE: ICD-10-CM

## 2019-02-15 DIAGNOSIS — D50.9 IRON DEFICIENCY ANEMIA, UNSPECIFIED IRON DEFICIENCY ANEMIA TYPE: Primary | ICD-10-CM

## 2019-02-15 LAB
25(OH)D3 SERPL-MCNC: 25 NG/ML (ref 30–100)
BASOPHILS # BLD AUTO: 0.03 10*3/MM3 (ref 0–0.2)
BASOPHILS NFR BLD AUTO: 0.7 % (ref 0–1.5)
DEPRECATED RDW RBC AUTO: 47.4 FL (ref 37–54)
EOSINOPHIL # BLD AUTO: 0.05 10*3/MM3 (ref 0–0.4)
EOSINOPHIL NFR BLD AUTO: 1.1 % (ref 0.3–6.2)
ERYTHROCYTE [DISTWIDTH] IN BLOOD BY AUTOMATED COUNT: 13.8 % (ref 12.3–15.4)
HCT VFR BLD AUTO: 40.4 % (ref 34–46.6)
HGB BLD-MCNC: 13.3 G/DL (ref 12–15.9)
IMM GRANULOCYTES # BLD AUTO: 0.01 10*3/MM3 (ref 0–0.05)
IMM GRANULOCYTES NFR BLD AUTO: 0.2 % (ref 0–0.5)
LYMPHOCYTES # BLD AUTO: 1.24 10*3/MM3 (ref 0.7–3.1)
LYMPHOCYTES NFR BLD AUTO: 28 % (ref 19.6–45.3)
MCH RBC QN AUTO: 30.9 PG (ref 26.6–33)
MCHC RBC AUTO-ENTMCNC: 32.9 G/DL (ref 31.5–35.7)
MCV RBC AUTO: 94 FL (ref 79–97)
MONOCYTES # BLD AUTO: 0.34 10*3/MM3 (ref 0.1–0.9)
MONOCYTES NFR BLD AUTO: 7.7 % (ref 5–12)
NEUTROPHILS # BLD AUTO: 2.76 10*3/MM3 (ref 1.4–7)
NEUTROPHILS NFR BLD AUTO: 62.3 % (ref 42.7–76)
NRBC BLD AUTO-RTO: 0 /100 WBC (ref 0–0)
PLATELET # BLD AUTO: 211 10*3/MM3 (ref 140–450)
PMV BLD AUTO: 10.6 FL (ref 6–12)
RBC # BLD AUTO: 4.3 10*6/MM3 (ref 3.77–5.28)
WBC NRBC COR # BLD: 4.43 10*3/MM3 (ref 3.4–10.8)

## 2019-02-15 PROCEDURE — 36416 COLLJ CAPILLARY BLOOD SPEC: CPT | Performed by: INTERNAL MEDICINE

## 2019-02-15 PROCEDURE — 36415 COLL VENOUS BLD VENIPUNCTURE: CPT | Performed by: INTERNAL MEDICINE

## 2019-02-15 PROCEDURE — 99215 OFFICE O/P EST HI 40 MIN: CPT | Performed by: INTERNAL MEDICINE

## 2019-02-15 PROCEDURE — 85025 COMPLETE CBC W/AUTO DIFF WBC: CPT | Performed by: INTERNAL MEDICINE

## 2019-02-15 PROCEDURE — 25010000002 FERRIC CARBOXYMALTOSE 750 MG/15ML SOLUTION 15 ML VIAL: Performed by: INTERNAL MEDICINE

## 2019-02-15 PROCEDURE — 82306 VITAMIN D 25 HYDROXY: CPT | Performed by: NURSE PRACTITIONER

## 2019-02-15 PROCEDURE — 96374 THER/PROPH/DIAG INJ IV PUSH: CPT | Performed by: INTERNAL MEDICINE

## 2019-02-15 RX ORDER — SODIUM CHLORIDE 9 MG/ML
250 INJECTION, SOLUTION INTRAVENOUS ONCE
Status: COMPLETED | OUTPATIENT
Start: 2019-02-15 | End: 2019-02-15

## 2019-02-15 RX ORDER — PROCHLORPERAZINE MALEATE 10 MG
10 TABLET ORAL ONCE
Status: DISCONTINUED | OUTPATIENT
Start: 2019-02-15 | End: 2019-02-15 | Stop reason: HOSPADM

## 2019-02-15 RX ADMIN — FERRIC CARBOXYMALTOSE INJECTION 750 MG: 50 INJECTION, SOLUTION INTRAVENOUS at 09:07

## 2019-02-15 RX ADMIN — SODIUM CHLORIDE 250 ML: 9 INJECTION, SOLUTION INTRAVENOUS at 09:07

## 2019-02-15 NOTE — PROGRESS NOTES
.     REASON FOR CONSULTATION:     1.  Right Leg chronic DVT (deep venous thromboembolism) in the external iliac vein discovered on 2/8/2018.  Patient is on low-dose Eliquis 2.5 mg twice a day.  history of acute DVT in 2014 when she was pregnant.  Laboratory study reported slightly elevated anticardiolipin IgM and also anti-phosphatidylserine IgM antibodies.  No other abnormalities.   2.  Venous Doppler study reported no external iliac vein thrombosis on 4/5/2018.  3.  Iron deficiency secondary to poor absorption due to gastric bypass surgery in May 2018.  Patient was started on Injectafer in early February 2019.                                 HISTORY OF PRESENT ILLNESS:  The patient is a 33 y.o. year old female who is here for 2-week re-evaluation because the above problems.     I saw her 2 weeks ago, at that time I diagnosed her of iron deficiency secondary to poor iron absorption due to gastric bypass surgery in May 2018.  We started her on Injectafer last week on 2/8/2019.  Also because patient reports lymphadenopathy, we obtained CT scan examination for neck chest abdomen pelvis for further evaluation.  Patient is here to discuss results of CT scan and also continue Injectafer infusion.     We also obtained medical records from the [facility].  Venous Doppler study on 1/18/2019 reported left leg negative for DVT or superficial vein thrombosis.  At that time patient complains of pain in the left leg.      Laboratory study on 1/28/2019 reported iron deficiency, with a ferritin 22.6 ng/mL, iron saturation 24%, free iron 92 TIBC 391.  CBC reported Hb 11.7, MCV 93.5, platelets 175,000 and WBC 4650.  Chemistry lab reported alk phosphatase 119, ALT 47 AST 27, otherwise normal CMP.     CT scan for neck chest abdomen and pelvis on 1/31/2019 reported mildly prominent bilateral cervical and left axillary lymph nodes measuring less than 1 cm in short axis dimension.  There was a probable hemorrhagic right  ovarian cyst.  There is also probable resolving granulomatous disease in the left upper lobe.  Radiologist recommended follow-up CT scan in 3-6 months.          Past Medical History:   Diagnosis Date   • Anemia    • Anxiety    • Depression    • DVT (deep vein thrombosis) in pregnancy (CMS/HCC) 2013   • GERD (gastroesophageal reflux disease)    • H/O Abnormal Pap smear of cervix     4+ years ago   • Tattoos    • Thrombophilia (CMS/HCC)      Past Surgical History:   Procedure Laterality Date   • CHOLECYSTECTOMY  2009   • COLPOSCOPY     • TUBAL ABDOMINAL LIGATION     • WISDOM TOOTH EXTRACTION     Gastric bypass in early May 2018.      HEMATOLOGIC/ONCOLOGIC HISTORY:  The patient is a 32 y.o. year old female who is here on 3/14/2018 for initial evaluation because of the newly discovered chronic DVT at the right external iliac vein and abnormal laboratory study for anti-phospholipid antibodies patient has a history of acute DVT 4 years ago when she was pregnant.       This patient has ambiguous history of lower extremity DVT. According to patient, 4 years ago when she had 4th pregnancy, she had preeclampsia and her pregnancy was terminated at 32 weeks with early delivery. She did not have previous episodes of preeclampsia for the previous 3 pregnancies. Nevertheless, according to patient, she was tested positive for D-dimer in her serum and was started on heparin. She reports no venous duplex study or CT scan for angiogram. After childbirth, she was given Coumadin for about 2 months and then stopped. Patient reports she never had really bad leg swelling and no pains in the legs when she was diagnosed of DVT at that point.     Recently patient was seen by surgeon, Dr. Brody and had EGD examination in late 02/2018. 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 Brody is planning to do gastric bypass surgery. This patient is also moderately obese, at least. However, when Dr. Brody learned about history of  suspicious DVT, she was referred to vascular surgeon, Donovan Calderon MD for further evaluation.     Dr. Calderon saw patient on 02/08/2018 and obtained venous duplex study that same day. The study reported chronic DVT in right external iliac vein; however, other veins in the right leg had no evidence for thrombosis. The left leg was completely negative for venous thrombosis in both deep and superficial veins.     Dr. Calderon also obtained laboratory study for hypercoagulable workup on 02/08/2018. The study reported weakly positive anticardiolipin IgM at 22 units/mL and antiphosphatidylserine IgM antibody 38 units/mL but negative for the corresponding IgG and IgA in both antibodies. She was completely negative for the anti-beta-2 glycoprotein 1 antibodies. This patient also had normal antithrombin activity 105%, normal protein S activity 90% and protein C activity 151%. Her serum free protein S antigen was 64% and total protein S antigen was 82%. She had marginally elevated fibrinogen at 474 mg/dL. She was tested negative for lupus anticoagulant. This patient was also tested negative for mutation, factor V Leiden, and factor II.    Patient was started on low-dose Eliquis 2.5 mg every 12 hours on 3/14/2018.    Patient also reports she had gastric bypass surgery done on May 1, 2018.     We repeated laboratory study on 6/5/2018, she still had a marginally elevated anticardiolipin IgM antibody but negative for the IgG and IgA subtype, and she was also tested negative for anti-phosphatidylserine IgM antibody this time, as well as IgG and IgA subtype, and also negative for anti-beta-2 glycoprotein 1 antibodies.    On 6/5/2018, She was mildly anemic with a hemoglobin 11.4.  Has normal platelets 184,000 and WBC 6900 including neutrophils 4800 lymphocytes 1300.     Patient reports on 1/28/2019 that she has a lot of symptoms. She has significant fatigue and also has problem maintaining sleep in the night. She also has pica for ice  chips. Patient reports she was not able to tolerate oral iron supplementation, which was started after her gastric bypass surgery for weight control. She lost more than 100 pounds since her gastric bypass surgery on 05/01/2018. Patient reports no melena, no hematochezia. She is getting vitamin B12 injection weekly.    Patient also reports she has significant night sweats for the past several weeks. She reports it was drenching sweating. Required changing clothes in the night. She also had low fever about 100°F. She reports no recent flu or viral infection. Patient reports she went to the emergency room at [facility] recently because of groin lymphadenopathy. Patient had ultrasound examination at that hospital.     Patient reports she is on Eliquis anticoagulation. She denies bleeding problem. She has no worsening or recurrent leg edema or pains. No chest pain. No dyspnea.     Laboratory study 1/28/2019 reported marginal anemia with hemoglobin 11.7 but normal platelets at 175,000 and WBC 4650. Her MCV was 93.5, MCHC 32.6. Her iron study reported ferritin 22.6, free iron 92, TIBC 391 and iron saturation 24%. Chemistry lab reported normal creatinine at 0.62. Normal electrolytes. Marginally elevated alkaline phosphatase 119 and ALT 47 but otherwise normal AST and total bilirubin.     Physical examination today showed bilateral small groin lymphadenopathy with left one measuring up to 2 cm in diameter, mobile. She also has a small left neck lymph node posterior to the SCM muscle. No lymphadenopathy in the right neck or supraclavicular or axillary area.    MEDICATIONS    Current Outpatient Medications:   •  apixaban (ELIQUIS) 2.5 MG tablet tablet, Take 1 tablet by mouth Every 12 (Twelve) Hours., Disp: 60 tablet, Rfl: 11  •  Calcium 500-100 MG-UNIT chewable tablet, Chew 1 tablet Every 4 (Four) Hours As Needed., Disp: , Rfl:   •  Cyanocobalamin (VITAMIN B-12 IJ), Inject  as directed., Disp: , Rfl:   •   ferrous sulfate 325 (65 FE) MG tablet, Take 325 mg by mouth Daily With Breakfast., Disp: , Rfl:   •  Multiple Vitamin (MULTI VITAMIN DAILY PO), Take  by mouth., Disp: , Rfl:   •  omeprazole (priLOSEC) 40 MG capsule, Take 40 mg by mouth Daily., Disp: , Rfl:     ALLERGIES:     Allergies   Allergen Reactions   • Ciprofloxacin Shortness Of Breath and Swelling   • Oxycodone-Acetaminophen Itching, Rash, Shortness Of Breath and Swelling   • Penicillins Rash       SOCIAL HISTORY:       Social History     Social History   • Marital status:      Spouse name: Fritz    • Number of children: 4   • Years of education: High school      Occupational History   • Worked in a [employer].  Previously also worked as a        Social History Main Topics   • Smoking status: Former Smoker     Quit date: 12/12/2016   • Smokeless tobacco: Never Used   • Alcohol use Only occasionally.     • Drug use: No    • Sexual activity: Not on file         FAMILY HISTORY:  Family History   Problem Relation Age of Onset   • Breast cancer Maternal Grandmother    • Depression Maternal Grandmother    • Heart disease Maternal Grandmother    • Cancer Maternal Grandfather    • Brain cancer Paternal Grandmother    • Breast cancer Paternal Grandmother        REVIEW OF SYSTEMS:  Review of Systems   Constitutional: Positive for diaphoresis (Drenching night sweats required changing closest), fatigue, fever (Low fever at 100 Fahrenheit for possibility weeks) and unexpected weight change (Lost more than 100 pounds since gastric bypass in May 2018). Negative for chills.        Excessive fatigue and night sweats no fever.  Patient complains weight gains and excessive thirst.   HENT: Negative for dental problem, nosebleeds, rhinorrhea, sinus pressure, sore throat and trouble swallowing.    Eyes: Negative for photophobia, discharge and visual disturbance.        Poor vision   Respiratory: Positive for shortness of breath. Negative for cough, chest  "tightness and wheezing.    Cardiovascular: Positive for palpitations. Negative for chest pain and leg swelling.   Gastrointestinal: Positive for abdominal pain. Negative for abdominal distention, anal bleeding, blood in stool, diarrhea, nausea, rectal pain and vomiting.   Endocrine: Negative for cold intolerance and heat intolerance.   Genitourinary: Positive for frequency. Negative for dysuria, hematuria, menstrual problem, urgency and vaginal bleeding.   Musculoskeletal: Positive for arthralgias. Negative for back pain and joint swelling.   Skin: Negative.  Negative for rash and wound.   Neurological: Negative for dizziness, syncope and headaches.   Hematological: Positive for adenopathy (Bilateral growing in the left neck since early January). Does not bruise/bleed easily.   Psychiatric/Behavioral: Positive for sleep disturbance. Negative for confusion and hallucinations.              Vitals:    02/15/19 0756   BP: 112/68   Pulse: 82   Resp: 14   Temp: 98.2 °F (36.8 °C)   SpO2: 100%  Comment: at rest   Weight: 61.2 kg (134 lb 14.4 oz)   Height: 163 cm (64.17\")   PainSc: 0-No pain   ECOG 0.     PHYSICAL EXAM:      CONSTITUTIONAL: Well-developed,  mild obese female BMI 35.3,  No distress, looks comfortable.  EYES:  Conjunctiva and lids unremarkable.   EARS,NOSE,MOUTH,THROAT:  Ears and nose appear unremarkable.  Lips appear unremarkable.  RESPIRATORY: Lungs clear to auscultation bilaterally.  Normal respiratory effort.   CARDIOVASCULAR:  Regular rhythm and rate.  Normal S1, S2.  No murmurs rubs or gallops.    GASTROINTESTINAL:  Nontender.  No hepatomegaly.  No splenomegaly.  Bowel sounds normal.    LYMPHATIC:  Left and neck small lymph nodes about a one centimeter posterior to the SCM muscle.  No other neck lymphadenopathy.  No axillary lymphadenopathy.  Has bilateral groin lymphadenopathy, largest one is about 1.5-2 cm in the left groin area.    MUSCULOSKELETAL:  Unremarkable gait.  No cyanosis or clubbing.  No " significant lower extremity edema.  SKIN:  Warm.  No rashes.  Patient has tattoos all over the body.    PSYCHIATRIC:  Normal judgment and insight.  Normal mood and affect.      RECENT LABS:    Lab Results   Component Value Date    WBC 4.65 01/28/2019    HGB 11.7 01/28/2019    HCT 35.9 01/28/2019    MCV 93.5 01/28/2019     01/28/2019     Lab Results   Component Value Date    NEUTROABS 2.69 01/28/2019     Lab Results   Component Value Date    GLUCOSE 97 01/28/2019    BUN 6 01/28/2019    CREATININE 0.70 01/31/2019    EGFRIFNONA 112 01/28/2019    BCR 9.7 01/28/2019    K 3.6 01/28/2019    CO2 21.3 (L) 01/28/2019    CALCIUM 9.3 01/28/2019    ALBUMIN 4.20 01/28/2019    AST 27 01/28/2019    ALT 47 (H) 01/28/2019     Sodium   Date Value Ref Range Status   01/28/2019 139 134 - 145 mmol/L Final     Potassium   Date Value Ref Range Status   01/28/2019 3.6 3.5 - 4.7 mmol/L Final     Total Bilirubin   Date Value Ref Range Status   01/28/2019 0.3 0.1 - 1.2 mg/dL Final     Alkaline Phosphatase   Date Value Ref Range Status   01/28/2019 119 (H) 38 - 116 U/L Final      Lab Results   Component Value Date    IRON 92 01/28/2019    TIBC 391 01/28/2019    FERRITIN 22.60 01/28/2019   Iron saturation 24%    IMAGE STUDY:   CT NECK, CHEST, ABDOMEN, AND PELVIS WITH IV CONTRAST     HISTORY: Concern for lymphoma.     TECHNIQUE: Radiation dose reduction techniques were utilized, including  automated exposure control and exposure modulation based on body size.   3 mm images were obtained through the neck, chest, abdomen, and pelvis  after the administration of IV contrast.      COMPARISON: CT abdomen and pelvis 08/31/2018.         FINDINGS:     Shotty bilateral cervical lymph nodes are present. The largest measure  up to 0.6 cm in short axis dimension and are located within the  bilateral internal jugular chains.     The parotid, submandibular and thyroid glands have an unremarkable  postcontrast appearance and are symmetric.      There is no hilar, mediastinal or axillary adenopathy by size criteria.     Mildly prominent left axillary lymph nodes measure up to 0.7 cm in short  axis dimension. Focal, roughly symmetric skin thickening is present over  the bilateral proximal upper extremities of indeterminate clinical  significance.     The heart is normal in size.     There is no pulmonary consolidation, pleural effusion or pneumothorax.  Focal area of partially calcified pulmonary nodularity within the left  upper lobe is likely reactive and mostly suggestive of resolving  granulomatous disease. The noncalcified portion measures up to 0.7 cm in  diameter.     Postsurgical changes from Fei-en-Y gastric bypass are present.      The liver, pancreas, spleen, adrenal glands and kidneys have an  unremarkable postcontrast CT appearance. There is no hydronephrosis.     Gallbladder is surgically absent with mild-to-moderate intra and  extrahepatic biliary ductal dilation, as before.     The bladder is unremarkable for its degree of distention.     There is no abdominal pelvic adenopathy by size criteria.     The large and small bowel are normal in caliber. The appendix is normal.     There is a hypodense right adnexal lesion measuring 4.1 x 2.4 cm with a  wispy area of internal hyperattenuation. A small amount of free fluid is  present in the pelvis. Focus of calcification is present within the left  ovary, as before. The left ovary has decreased in size since 08/31/2018.     There is no free intraperitoneal air.     No suspicious lytic or blastic osseous lesions are present.     IMPRESSION:  1.  Mildly prominent bilateral cervical and left axillary lymph nodes  measuring less than 1 cm in short axis dimension.  2.  Probable hemorrhagic right ovarian cyst. Correlation with patient's  symptoms is recommended.  3.  Probable resolving granulomatous disease within the left upper lobe.  Recommend follow-up chest CT in 3-6 months to ensure stability  and/or  resolution of the nodular density.        Assessment/Plan      1. Right leg chronic DVT (deep venous thromboembolism) in the external iliac vein discovered on 2/8/2018.  She had a one episode of acute DVT in her lower extremity in 2014 when she was pregnant, no details are available.  She was treated with Coumadin for about 2 months.  This patient possibly has antiphospholipid antibody syndrome.  Venous Doppler study on 2/8/2018 reported chronic DVT in the right external iliac vein.  She also had laboratory study reported slightly elevated anticardiolipin IgM and also anti-phosphatidylserine IgM antibodies but no other antibodies in the antiphospholipid antibody family.  She had normal protein C and protein S activity, no evidence of mutation for both factor V Leiden and factor II.      We started patient on low-dose Eliquis 2.5 mg every 12 hours in middle March 2018.  She tolerated therapy very well.  This patient had a repeated a venous Doppler study on 4/5/2018 which showed no evidence of right external iliac vein thrombosis.     Laboratory study on 6/5/2018 already showed resolution of anti-phosphatidylserine antibodie IgM subtype, and only had a weakly positive anticardiolipin IgM antibody.  Her antibodies were negative.    Patient recently complained of pain in her left leg, she was seen at [facility] and had left leg venous Doppler study on 1/18/2019, which showed no evidence of DVT or SVT in the left leg.    Patient currently is on low-dose Eliquis 2.5 mg twice a day.  Patient is to be on anticoagulation for 12 months now.  I asked the patient to discontinue Eliquis for now.  We will repeat antiphospholipid antibodies in 3 weeks for reevaluation, to avoid possible false positive results from the Eliquis.    In the meantime I think we should also repeat right leg venous Doppler study for reassessment.     2. Lymphadenopathy in bilateral groin area and also left neck. This patient also has  drenching night sweats and low fever for the past few weeks. Highly suspect this is lymphoma.     We obtained CT scan for neck chest abdomen pelvis with IV contrast obtained 1/31/2019.  The study reported shotty lymphadenopathy in the neck, left axilla but otherwise no apparent lymphadenopathy.  I independently reviewed CT scan images and agreed with the report.      3. Iron deficiency secondary to poor iron absorption due to gastric bypass for weight control proprius. This patient has gastric bypass in early 05/2018 and was not able to tolerate oral iron treatment with significant nausea and abdomen cramping. Laboratory study shows persistent mild anemia and her iron study on 1/28/2019 showed evidence of iron deficiency with ferritin at 22.6.     Patient was started on Injectafer on 2/8/2019.  We will continue today for second dose.     I discussed with the patient today, she eventually become iron deficient again because of poor iron absorption.  I recommended monitoring her iron study every 6 months for ferritin and iron profile together with CBC check.  We will obtain iron study in 1 month to set up a post iron infusion new baseline.       4. Vitamin B12. Patient reports she has been on B12 injection weekly since her gastric bypass. She is receiving treatment from her primary care physician’s office.      5.  Pulmonary nodule, possibly resolving granulomatous disease on chest CT 1/31/2019.  Radiologist recommended CT scan in 3-6 months for reevaluation.     6.  Probable hemorrhagic right ovarian cyst on CT scan.  Patient is asymptomatic.     PLAN:   1.  Proceed ahead with second dose of intravenous Injectafer today.   2.  Discontinue Eliquis for now.  3.  Repeat laboratory studies in 3 weeks, including anticardiolipin antibodies, anti-beta-2 glycoprotein 1 antibodies, and anti-phosphatidylserine antibodies, together with ferritin, iron profile and CBC.   4.  Obtain right leg venous Doppler study in 2-3  weeks.  5. I will bring patient back in 1 month for reevaluation.     I personally reviewed the CT scan images from 1/31/2019.  I also initiated the images with the patient today for suture.        FUNMILAYO CAR M.D., Ph.D.    2/15/2019       CC:   MD Conner Alvarez M.D., APRN      Dictated using Dragon Dictation.

## 2019-02-18 ENCOUNTER — TELEPHONE (OUTPATIENT)
Dept: ONCOLOGY | Facility: CLINIC | Age: 34
End: 2019-02-18

## 2019-02-18 NOTE — TELEPHONE ENCOUNTER
----- Message from Raissa Moe RN sent at 2/18/2019  8:11 AM EST -----  Regarding: FW: Please add right leg venous Doppler study      ----- Message -----  From: Lilo Cary MD PhD  Sent: 2/15/2019   9:00 PM  To: [facility] Clinical Pool  Subject: Please add right leg venous Doppler study        Please call patient to arrange right leg venous Doppler study in 2 weeks at Dr. Donovan Calderon's office on 3rd floor vascular surgery.    I have ordered in [facility].

## 2019-03-08 ENCOUNTER — APPOINTMENT (OUTPATIENT)
Dept: LAB | Facility: HOSPITAL | Age: 34
End: 2019-03-08

## 2019-03-11 ENCOUNTER — LAB (OUTPATIENT)
Dept: LAB | Facility: HOSPITAL | Age: 34
End: 2019-03-11

## 2019-03-11 DIAGNOSIS — D68.61 ANTIPHOSPHOLIPID ANTIBODY WITH HYPERCOAGULABLE STATE (HCC): ICD-10-CM

## 2019-03-11 DIAGNOSIS — D50.8 IRON DEFICIENCY ANEMIA SECONDARY TO INADEQUATE DIETARY IRON INTAKE: ICD-10-CM

## 2019-03-11 LAB
BASOPHILS # BLD AUTO: 0.04 10*3/MM3 (ref 0–0.2)
BASOPHILS NFR BLD AUTO: 0.9 % (ref 0–1.5)
DEPRECATED RDW RBC AUTO: 48.7 FL (ref 37–54)
EOSINOPHIL # BLD AUTO: 0.06 10*3/MM3 (ref 0–0.4)
EOSINOPHIL NFR BLD AUTO: 1.4 % (ref 0.3–6.2)
ERYTHROCYTE [DISTWIDTH] IN BLOOD BY AUTOMATED COUNT: 13.5 % (ref 12.3–15.4)
FERRITIN SERPL-MCNC: 422.9 NG/ML (ref 11–207)
HCT VFR BLD AUTO: 38.2 % (ref 34–46.6)
HGB BLD-MCNC: 12.7 G/DL (ref 12–15.9)
IMM GRANULOCYTES # BLD AUTO: 0.01 10*3/MM3 (ref 0–0.05)
IMM GRANULOCYTES NFR BLD AUTO: 0.2 % (ref 0–0.5)
IRON 24H UR-MRATE: 100 MCG/DL (ref 37–145)
IRON SATN MFR SERPL: 32 % (ref 14–48)
LYMPHOCYTES # BLD AUTO: 1.33 10*3/MM3 (ref 0.7–3.1)
LYMPHOCYTES NFR BLD AUTO: 31.2 % (ref 19.6–45.3)
MCH RBC QN AUTO: 32.2 PG (ref 26.6–33)
MCHC RBC AUTO-ENTMCNC: 33.2 G/DL (ref 31.5–35.7)
MCV RBC AUTO: 96.7 FL (ref 79–97)
MONOCYTES # BLD AUTO: 0.31 10*3/MM3 (ref 0.1–0.9)
MONOCYTES NFR BLD AUTO: 7.3 % (ref 5–12)
NEUTROPHILS # BLD AUTO: 2.51 10*3/MM3 (ref 1.4–7)
NEUTROPHILS NFR BLD AUTO: 59 % (ref 42.7–76)
NRBC BLD AUTO-RTO: 0 /100 WBC (ref 0–0)
PLATELET # BLD AUTO: 159 10*3/MM3 (ref 140–450)
PMV BLD AUTO: 10.5 FL (ref 6–12)
RBC # BLD AUTO: 3.95 10*6/MM3 (ref 3.77–5.28)
TIBC SERPL-MCNC: 308 MCG/DL (ref 249–505)
TRANSFERRIN SERPL-MCNC: 220 MG/DL (ref 200–360)
WBC NRBC COR # BLD: 4.26 10*3/MM3 (ref 3.4–10.8)

## 2019-03-11 PROCEDURE — 82728 ASSAY OF FERRITIN: CPT | Performed by: INTERNAL MEDICINE

## 2019-03-11 PROCEDURE — 84466 ASSAY OF TRANSFERRIN: CPT | Performed by: INTERNAL MEDICINE

## 2019-03-11 PROCEDURE — 83540 ASSAY OF IRON: CPT | Performed by: INTERNAL MEDICINE

## 2019-03-11 PROCEDURE — 85025 COMPLETE CBC W/AUTO DIFF WBC: CPT | Performed by: INTERNAL MEDICINE

## 2019-03-11 PROCEDURE — 36415 COLL VENOUS BLD VENIPUNCTURE: CPT | Performed by: INTERNAL MEDICINE

## 2019-03-12 LAB
CARDIOLIPIN IGG SER IA-ACNC: <9 GPL U/ML (ref 0–14)
CARDIOLIPIN IGM SER IA-ACNC: 11 MPL U/ML (ref 0–12)

## 2019-03-13 LAB
B2 GLYCOPROT1 IGA SER-ACNC: <9 GPI IGA UNITS (ref 0–25)
B2 GLYCOPROT1 IGG SER-ACNC: <9 GPI IGG UNITS (ref 0–20)
B2 GLYCOPROT1 IGM SER-ACNC: <9 GPI IGM UNITS (ref 0–32)
LA NT PLATELET PPP: 41 SEC (ref 0–51.9)
LUPUS ANTICOAGULANT REFLEX: NORMAL
PS IGA SER-ACNC: 1 APS IGA (ref 0–20)
PS IGG SER-ACNC: 2 GPS IGG (ref 0–11)
PS IGM SER-ACNC: 44 MPS IGM (ref 0–25)
SCREEN DRVVT: 34.1 SEC (ref 0–47)

## 2019-03-15 ENCOUNTER — APPOINTMENT (OUTPATIENT)
Dept: LAB | Facility: HOSPITAL | Age: 34
End: 2019-03-15

## 2019-03-15 ENCOUNTER — APPOINTMENT (OUTPATIENT)
Dept: ONCOLOGY | Facility: CLINIC | Age: 34
End: 2019-03-15

## 2019-03-25 ENCOUNTER — APPOINTMENT (OUTPATIENT)
Dept: LAB | Facility: HOSPITAL | Age: 34
End: 2019-03-25

## 2019-04-24 ENCOUNTER — APPOINTMENT (OUTPATIENT)
Dept: LAB | Facility: HOSPITAL | Age: 34
End: 2019-04-24

## 2019-04-24 ENCOUNTER — APPOINTMENT (OUTPATIENT)
Dept: ONCOLOGY | Facility: CLINIC | Age: 34
End: 2019-04-24

## 2019-05-15 RX ORDER — APIXABAN 2.5 MG/1
TABLET, FILM COATED ORAL
Qty: 60 TABLET | Refills: 0 | OUTPATIENT
Start: 2019-05-15

## 2019-06-24 ENCOUNTER — TELEPHONE (OUTPATIENT)
Dept: ONCOLOGY | Facility: HOSPITAL | Age: 34
End: 2019-06-24

## 2019-06-24 NOTE — TELEPHONE ENCOUNTER
----- Message from Mayra Harris sent at 6/24/2019  8:54 AM EDT -----  Contact: [phone]  Pt needs a refill for her blood thinner

## 2019-06-24 NOTE — TELEPHONE ENCOUNTER
Prescription escribed.     ----- Message from Lilo Cary MD PhD sent at 6/24/2019 12:27 PM EDT -----  Please refill at 2.5 mg Q12 hrs.     Thanks Alison!    Dr. AQUINO  ----- Message -----  From: Alison Tillman, RN  Sent: 6/24/2019   9:25 AM  To: Lilo Cary MD PhD    Dr Cary, pt requesting refill for Eliquis. Last office note in February said to hold Eliquis for now. Pt says she was off for a few weeks for labs and then restarted it. Is it ok to reorder this for patient? Thanks

## 2019-06-27 DIAGNOSIS — D68.61 ANTIPHOSPHOLIPID ANTIBODY WITH HYPERCOAGULABLE STATE (HCC): Primary | ICD-10-CM

## 2019-07-03 ENCOUNTER — APPOINTMENT (OUTPATIENT)
Dept: LAB | Facility: HOSPITAL | Age: 34
End: 2019-07-03

## 2019-07-03 ENCOUNTER — APPOINTMENT (OUTPATIENT)
Dept: ONCOLOGY | Facility: CLINIC | Age: 34
End: 2019-07-03

## 2019-08-14 ENCOUNTER — OFFICE VISIT (OUTPATIENT)
Dept: ONCOLOGY | Facility: CLINIC | Age: 34
End: 2019-08-14

## 2019-08-14 ENCOUNTER — LAB (OUTPATIENT)
Dept: LAB | Facility: HOSPITAL | Age: 34
End: 2019-08-14

## 2019-08-14 ENCOUNTER — APPOINTMENT (OUTPATIENT)
Dept: LAB | Facility: HOSPITAL | Age: 34
End: 2019-08-14

## 2019-08-14 VITALS
HEIGHT: 64 IN | OXYGEN SATURATION: 100 % | RESPIRATION RATE: 18 BRPM | DIASTOLIC BLOOD PRESSURE: 70 MMHG | TEMPERATURE: 98 F | SYSTOLIC BLOOD PRESSURE: 113 MMHG | HEART RATE: 77 BPM | BODY MASS INDEX: 21.34 KG/M2 | WEIGHT: 125 LBS

## 2019-08-14 DIAGNOSIS — D50.8 IRON DEFICIENCY ANEMIA SECONDARY TO INADEQUATE DIETARY IRON INTAKE: ICD-10-CM

## 2019-08-14 DIAGNOSIS — D68.61 ANTIPHOSPHOLIPID ANTIBODY WITH HYPERCOAGULABLE STATE (HCC): ICD-10-CM

## 2019-08-14 DIAGNOSIS — I82.501 LEG DVT (DEEP VENOUS THROMBOEMBOLISM), CHRONIC, RIGHT (HCC): ICD-10-CM

## 2019-08-14 DIAGNOSIS — Z98.84 HISTORY OF GASTRIC BYPASS: ICD-10-CM

## 2019-08-14 DIAGNOSIS — R59.9 LYMPH NODE ENLARGEMENT: ICD-10-CM

## 2019-08-14 DIAGNOSIS — R10.32 LEFT LOWER QUADRANT PAIN: ICD-10-CM

## 2019-08-14 DIAGNOSIS — R91.1 PULMONARY NODULE: Primary | ICD-10-CM

## 2019-08-14 LAB
BASOPHILS # BLD AUTO: 0.03 10*3/MM3 (ref 0–0.2)
BASOPHILS NFR BLD AUTO: 0.7 % (ref 0–1.5)
DEPRECATED RDW RBC AUTO: 42.7 FL (ref 37–54)
EOSINOPHIL # BLD AUTO: 0.05 10*3/MM3 (ref 0–0.4)
EOSINOPHIL NFR BLD AUTO: 1.1 % (ref 0.3–6.2)
ERYTHROCYTE [DISTWIDTH] IN BLOOD BY AUTOMATED COUNT: 11.7 % (ref 12.3–15.4)
FERRITIN SERPL-MCNC: 245.8 NG/ML (ref 11–207)
HCT VFR BLD AUTO: 38.6 % (ref 34–46.6)
HGB BLD-MCNC: 13 G/DL (ref 12–15.9)
IMM GRANULOCYTES # BLD AUTO: 0.02 10*3/MM3 (ref 0–0.05)
IMM GRANULOCYTES NFR BLD AUTO: 0.4 % (ref 0–0.5)
IRON 24H UR-MRATE: 81 MCG/DL (ref 37–145)
IRON SATN MFR SERPL: 26 % (ref 14–48)
LYMPHOCYTES # BLD AUTO: 1.13 10*3/MM3 (ref 0.7–3.1)
LYMPHOCYTES NFR BLD AUTO: 25.1 % (ref 19.6–45.3)
MCH RBC QN AUTO: 33.4 PG (ref 26.6–33)
MCHC RBC AUTO-ENTMCNC: 33.7 G/DL (ref 31.5–35.7)
MCV RBC AUTO: 99.2 FL (ref 79–97)
MONOCYTES # BLD AUTO: 0.3 10*3/MM3 (ref 0.1–0.9)
MONOCYTES NFR BLD AUTO: 6.7 % (ref 5–12)
NEUTROPHILS # BLD AUTO: 2.97 10*3/MM3 (ref 1.7–7)
NEUTROPHILS NFR BLD AUTO: 66 % (ref 42.7–76)
NRBC BLD AUTO-RTO: 0 /100 WBC (ref 0–0.2)
PLATELET # BLD AUTO: 207 10*3/MM3 (ref 140–450)
PMV BLD AUTO: 10.1 FL (ref 6–12)
RBC # BLD AUTO: 3.89 10*6/MM3 (ref 3.77–5.28)
TIBC SERPL-MCNC: 314 MCG/DL (ref 249–505)
TRANSFERRIN SERPL-MCNC: 224 MG/DL (ref 200–360)
WBC NRBC COR # BLD: 4.5 10*3/MM3 (ref 3.4–10.8)

## 2019-08-14 PROCEDURE — 82728 ASSAY OF FERRITIN: CPT

## 2019-08-14 PROCEDURE — 99214 OFFICE O/P EST MOD 30 MIN: CPT | Performed by: INTERNAL MEDICINE

## 2019-08-14 PROCEDURE — 36415 COLL VENOUS BLD VENIPUNCTURE: CPT

## 2019-08-14 PROCEDURE — 84466 ASSAY OF TRANSFERRIN: CPT

## 2019-08-14 PROCEDURE — 83540 ASSAY OF IRON: CPT

## 2019-08-14 PROCEDURE — 85025 COMPLETE CBC W/AUTO DIFF WBC: CPT

## 2019-08-14 RX ORDER — SUCRALFATE 1 G/1
TABLET ORAL
COMMUNITY
Start: 2019-08-08

## 2019-08-14 RX ORDER — RANITIDINE 150 MG/1
CAPSULE ORAL
COMMUNITY
Start: 2019-08-08

## 2019-08-14 NOTE — PROGRESS NOTES
REASON FOR FOLLOW UP:     1.  Right Leg chronic DVT (deep venous thromboembolism) in the external iliac vein discovered on 2/8/2018.  Patient is on low-dose Eliquis 2.5 mg twice a day.  history of acute DVT in 2014 when she was pregnant.  Laboratory study reported slightly elevated anticardiolipin IgM and also anti-phosphatidylserine IgM antibodies.  No other abnormalities.   2.  Venous Doppler study reported no external iliac vein thrombosis on 4/5/2018.  3.  Iron deficiency secondary to poor absorption due to gastric bypass surgery in May 2018.  Patient was started on Injectafer in early February 2019.                              HISTORY OF PRESENT ILLNESS:  The patient is a 33 y.o. year old female who is here for 6-month re-evaluation.    The patient reports she has been having pain in her bilateral lower extremities which limits her ambulation and wakes her up throughout the night up to 4 times nightly. The patient state she recently underwent recent herniorrhaphy by Dr. Brody, who also performed her gastric bypass in May 2018, approximately 3 weeks ago. She denies any fever or abnormal weight loss. She has not recently felt any nodules in her bilateral axillae. Patient notes she has an ultrasound scheduled next week with her OBGYN for further evaluation of ovarian cyst.     She is currently taking oral B-complex, Vitamin C, Vitamin D-3 and Calcium supplementation. She does note she continues to crave ice chips.     Recent lower extremity venous doppler performed on 03/11/2019 showed deep venous reflux in the right lower extremity, no evidence of DVT, and mild chronic short saphenous vein thrombus.     Laboratory study on 03/11/2019 showed Phosphatidylserine Antibodies showed Antiphosphatidylserine IgM elevated at 44.   Lupus anticoagulant was not detected, Beta-2 Glycoprotein Antibodies were <9. IgG/IgM Anticardiolipin Antibody was normal. Iron profile was normal, however ferritin was elevated at 422.9.      Laboratory study performed today 8/14/2019 showed normal Hb 13.0, WBC 4,500 and platelet 207,000.     Past Medical History:   Diagnosis Date   • Anemia    • Anxiety    • Depression    • DVT (deep vein thrombosis) in pregnancy (CMS/HCC) 2013   • GERD (gastroesophageal reflux disease)    • H/O Abnormal Pap smear of cervix     4+ years ago   • Tattoos    • Thrombophilia (CMS/HCC)      Past Surgical History:   Procedure Laterality Date   • CHOLECYSTECTOMY  2009   • COLPOSCOPY     • HERNIA REPAIR  07/23/2019    [facility]    • TUBAL ABDOMINAL LIGATION     • WISDOM TOOTH EXTRACTION     Gastric bypass in early May 2018.      HEMATOLOGIC/ONCOLOGIC HISTORY:  The patient is a 32 y.o. year old female who is here on 3/14/2018 for initial evaluation because of the newly discovered chronic DVT at the right external iliac vein and abnormal laboratory study for anti-phospholipid antibodies patient has a history of acute DVT 4 years ago when she was pregnant.       This patient has ambiguous history of lower extremity DVT. According to patient, 4 years ago when she had 4th pregnancy, she had preeclampsia and her pregnancy was terminated at 32 weeks with early delivery. She did not have previous episodes of preeclampsia for the previous 3 pregnancies. Nevertheless, according to patient, she was tested positive for D-dimer in her serum and was started on heparin. She reports no venous duplex study or CT scan for angiogram. After childbirth, she was given Coumadin for about 2 months and then stopped. Patient reports she never had really bad leg swelling and no pains in the legs when she was diagnosed of DVT at that point.     Recently patient was seen by surgeon, Dr. Brody and had EGD examination in late 02/2018. 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 Brody is planning to do gastric bypass surgery. This patient is also moderately obese, at least. However, when Dr. Brody learned about history of suspicious  DVT, she was referred to vascular surgeon, Donovan Calderon MD for further evaluation.     Dr. Calderon saw patient on 02/08/2018 and obtained venous duplex study that same day. The study reported chronic DVT in right external iliac vein; however, other veins in the right leg had no evidence for thrombosis. The left leg was completely negative for venous thrombosis in both deep and superficial veins.     Dr. Calderon also obtained laboratory study for hypercoagulable workup on 02/08/2018. The study reported weakly positive anticardiolipin IgM at 22 units/mL and antiphosphatidylserine IgM antibody 38 units/mL but negative for the corresponding IgG and IgA in both antibodies. She was completely negative for the anti-beta-2 glycoprotein 1 antibodies. This patient also had normal antithrombin activity 105%, normal protein S activity 90% and protein C activity 151%. Her serum free protein S antigen was 64% and total protein S antigen was 82%. She had marginally elevated fibrinogen at 474 mg/dL. She was tested negative for lupus anticoagulant. This patient was also tested negative for mutation, factor V Leiden, and factor II.    Patient was started on low-dose Eliquis 2.5 mg every 12 hours on 3/14/2018.    Patient also reports she had gastric bypass surgery done on May 1, 2018.     We repeated laboratory study on 6/5/2018, she still had a marginally elevated anticardiolipin IgM antibody but negative for the IgG and IgA subtype, and she was also tested negative for anti-phosphatidylserine IgM antibody this time, as well as IgG and IgA subtype, and also negative for anti-beta-2 glycoprotein 1 antibodies.    On 6/5/2018, She was mildly anemic with a hemoglobin 11.4.  Has normal platelets 184,000 and WBC 6900 including neutrophils 4800 lymphocytes 1300.     Patient reports on 1/28/2019 that she has a lot of symptoms. She has significant fatigue and also has problem maintaining sleep in the night. She also has pica for ice chips. Patient  reports she was not able to tolerate oral iron supplementation, which was started after her gastric bypass surgery for weight control. She lost more than 100 pounds since her gastric bypass surgery on 05/01/2018. Patient reports no melena, no hematochezia. She is getting vitamin B12 injection weekly.    Patient also reports she has significant night sweats for the past several weeks. She reports it was drenching sweating. Required changing clothes in the night. She also had low fever about 100°F. She reports no recent flu or viral infection. Patient reports she went to the emergency room at [facility] recently because of groin lymphadenopathy. Patient had ultrasound examination at that hospital.     Patient reports she is on Eliquis anticoagulation. She denies bleeding problem. She has no worsening or recurrent leg edema or pains. No chest pain. No dyspnea.     Laboratory study 1/28/2019 reported marginal anemia with hemoglobin 11.7 but normal platelets at 175,000 and WBC 4650. Her MCV was 93.5, MCHC 32.6. Her iron study reported ferritin 22.6, free iron 92, TIBC 391 and iron saturation 24%. Chemistry lab reported normal creatinine at 0.62. Normal electrolytes. Marginally elevated alkaline phosphatase 119 and ALT 47 but otherwise normal AST and total bilirubin.     Physical examination showed bilateral small groin lymphadenopathy with left one measuring up to 2 cm in diameter, mobile. She also has a small left neck lymph node posterior to the SCM muscle. No lymphadenopathy in the right neck or supraclavicular or axillary area.    Patient was diagnosed with iron deficiency secondary to poor iron absorption due to gastric bypass surgery in May 2018.  We started her on Injectafer on 2/8/2019.  Also because patient reports lymphadenopathy, we obtained CT scan examination for neck chest abdomen pelvis for further evaluation.     CT scan for neck chest abdomen and pelvis on 1/31/2019 reported mildly  prominent bilateral cervical and left axillary lymph nodes measuring less than 1 cm in short axis dimension.  There was a probable hemorrhagic right ovarian cyst.  There is also probable resolving granulomatous disease in the left upper lobe.  Radiologist recommended follow-up CT scan in 3-6 months.    We also obtained medical records from the [facility].  Venous Doppler study on 1/18/2019 reported left leg negative for DVT or superficial vein thrombosis.  At that time patient complains of pain in the left leg.      Patient was given Injectafer weekly x 2 doses in Febuaery 2019.    Most recent laboratory study on 03/11/2019 showed a normal CBC. Lupus anticoagulant was not detected, Beta-2 Glycoprotein Antibodies were <9. IgG/IgM Anticardiolipin Antibody was normal. Iron profile was normal, however ferritin was elevated at 422.9. Phosphatidylserine Antibodies showed Antiphosphatidylserine IgM elevated at 44.     Laboratory study performed today 8/14/2019 showed normal Hb 13.0, WBC 4,500 and platelet 207,000. Ferriitn 245.8, iron sats 26%, iron 81 and TIBC 314.     MEDICATIONS    Current Outpatient Medications:   •  apixaban (ELIQUIS) 2.5 MG tablet tablet, Take 1 tablet by mouth Every 12 (Twelve) Hours., Disp: 60 tablet, Rfl: 11  •  ferrous sulfate 325 (65 FE) MG tablet, Take 325 mg by mouth Daily With Breakfast., Disp: , Rfl:   •  Multiple Vitamin (MULTI VITAMIN DAILY PO), Take  by mouth., Disp: , Rfl:   •  omeprazole (priLOSEC) 40 MG capsule, Take 40 mg by mouth Daily., Disp: , Rfl:   •  Cyanocobalamin (VITAMIN B-12 IJ), Inject  as directed., Disp: , Rfl:   •  ranitidine (ZANTAC) 150 MG capsule, , Disp: , Rfl:   •  sucralfate (CARAFATE) 1 g tablet, , Disp: , Rfl:     ALLERGIES:     Allergies   Allergen Reactions   • Ciprofloxacin Shortness Of Breath and Swelling   • Oxycodone-Acetaminophen Itching, Rash, Shortness Of Breath and Swelling   • Penicillins Rash       SOCIAL HISTORY:       Social History      Social History   • Marital status:      Spouse name: Fritz    • Number of children: 4   • Years of education: High school      Occupational History   • Worked in a [employer].  Previously also worked as a        Social History Main Topics   • Smoking status: Former Smoker     Quit date: 12/12/2016   • Smokeless tobacco: Never Used   • Alcohol use Only occasionally.     • Drug use: No    • Sexual activity: Not on file         FAMILY HISTORY:  Family History   Problem Relation Age of Onset   • Breast cancer Maternal Grandmother    • Depression Maternal Grandmother    • Heart disease Maternal Grandmother    • Cancer Maternal Grandfather    • Brain cancer Paternal Grandmother    • Breast cancer Paternal Grandmother        REVIEW OF SYSTEMS:  Review of Systems   Constitutional: Positive for fatigue and unexpected weight change (Lost more than 100 pounds since gastric bypass in May 2018). Negative for chills and fever.        Excessive fatigue and night sweats no fever.  Patient complains weight gains and excessive thirst.   HENT: Negative for dental problem, nosebleeds, rhinorrhea and sore throat.    Eyes: Negative for discharge and visual disturbance.        Poor vision   Respiratory: Negative for cough and shortness of breath.    Cardiovascular: Positive for palpitations. Negative for chest pain and leg swelling.   Gastrointestinal: Positive for abdominal pain. Negative for anal bleeding, blood in stool, diarrhea and nausea.   Endocrine: Negative for cold intolerance and heat intolerance.   Genitourinary: Positive for frequency. Negative for dysuria, hematuria, menstrual problem and urgency.   Musculoskeletal: Positive for arthralgias. Negative for back pain and joint swelling.   Skin: Negative.  Negative for rash and wound.   Neurological: Negative for dizziness, syncope and headaches.   Hematological: Positive for adenopathy (Bilateral growing in the left neck since early January). Does not  "bruise/bleed easily.   Psychiatric/Behavioral: Negative for confusion and sleep disturbance.          Vitals:    08/14/19 1158   BP: 113/70   Pulse: 77   Resp: 18   Temp: 98 °F (36.7 °C)   SpO2: 100%   Weight: 56.7 kg (125 lb)   Height: 163 cm (64.17\")   PainSc:   4   PainLoc: Leg  Comment: both. worse at night   ECOG 0.     PHYSICAL EXAM:      GENERAL:  Well-developed, well-nourished female in no acute distress.   SKIN:  Warm, dry without rashes, purpura or petechiae.  EYES:  Pupils equal, round and reactive to light.  EOMs intact.  Conjunctivae normal.  EARS:  Hearing intact.  NOSE:  No nasal discharge.  MOUTH:  Tongue is well-papillated; no stomatitis or ulcers.  Lips normal.  THROAT:  Oropharynx without lesions or exudates.  NECK:  Supple with good range of motion; no thyromegaly or masses, no JVD.  LYMPHATICS:  No cervical, supraclavicular, axillary or inguinal adenopathy.  CHEST:  Lungs clear to auscultation. Good airflow.  CARDIAC:  Regular rate and rhythm without murmurs, rubs or gallops. Normal S1,S2.  ABDOMEN:  Soft, nontender with no hepatosplenomegaly or masses. Bowel sounds normal.  EXTREMITIES:  Small lymph node palpable on exam on her upper right thigh.   NEUROLOGICAL:  Cranial Nerves II-XII grossly intact.  No focal neurological deficits.  PSYCHIATRIC:  Normal affect and mood.          RECENT LABS:    Lab Results   Component Value Date    WBC 4.50 08/14/2019    HGB 13.0 08/14/2019    HCT 38.6 08/14/2019    MCV 99.2 (H) 08/14/2019     08/14/2019     Lab Results   Component Value Date    NEUTROABS 2.97 08/14/2019     Lab Results   Component Value Date    GLUCOSE 97 01/28/2019    BUN 6 01/28/2019    CREATININE 0.70 01/31/2019    EGFRIFNONA 112 01/28/2019    BCR 9.7 01/28/2019    K 3.6 01/28/2019    CO2 21.3 (L) 01/28/2019    CALCIUM 9.3 01/28/2019    ALBUMIN 4.20 01/28/2019    LABIL2 1.5 09/13/2018    AST 27 01/28/2019    ALT 47 (H) 01/28/2019     Sodium   Date Value Ref Range Status   01/28/2019 " 139 134 - 145 mmol/L Final   09/13/2018 140 137 - 145 mmol/L Final     Potassium   Date Value Ref Range Status   01/28/2019 3.6 3.5 - 4.7 mmol/L Final   09/13/2018 3.6 3.5 - 5.1 mmol/L Final     Total Bilirubin   Date Value Ref Range Status   01/28/2019 0.3 0.1 - 1.2 mg/dL Final   09/13/2018 0.4 0.2 - 1.3 mg/dL Final     Alkaline Phosphatase   Date Value Ref Range Status   01/28/2019 119 (H) 38 - 116 U/L Final   09/13/2018 87 38 - 126 U/L Final      Lab Results   Component Value Date    IRON 81 08/14/2019    TIBC 314 08/14/2019    FERRITIN 245.80 (H) 08/14/2019   Iron saturation 26%  .      Assessment/Plan      1. Right leg chronic DVT (deep venous thromboembolism) in the external iliac vein discovered on 2/8/2018.  She had one episode of acute DVT in her lower extremity in 2014 when she was pregnant, no details are available.  She was treated with Coumadin for about 2 months.  This patient possibly has antiphospholipid antibody syndrome.  Venous Doppler study on 2/8/2018 reported chronic DVT in the right external iliac vein.  She also had laboratory study reported slightly elevated anticardiolipin IgM and also anti-phosphatidylserine IgM antibodies but no other antibodies in the antiphospholipid antibody family.  She had normal protein C and protein S activity, no evidence of mutation for both factor V Leiden and factor II.      · We started patient on low-dose Eliquis 2.5 mg every 12 hours in middle March 2018.  She tolerated therapy very well.  This patient had a repeated a venous Doppler study on 4/5/2018 which showed no evidence of right external iliac vein thrombosis.   · Laboratory study on 6/5/2018 already showed resolution of anti-phosphatidylserine antibodie IgM subtype, and only had a weakly positive anticardiolipin IgM antibody.  Her ACL antibodies were negative.  · Patient recently complained of pain in her left leg, she was seen at [facility] and had left leg venous Doppler study on 1/18/2019,  which showed no evidence of DVT or SVT in the left leg.  · Patient was on low-dose Eliquis 2.5 mg twice a day.   I asked the patient to discontinue Eliquis 2/15/2019.  We will repeat antiphospholipid antibodies for reevaluation, to avoid possible false positive results from the Eliquis.  · Repeat antiphospholipid antibodies on 03/11/2019 showed a normal CBC. Lupus anticoagulant was not detected, Beta-2 Glycoprotein Antibodies were <9. IgG/IgM Anticardiolipin Antibody was normal. Iron profile was normal, however ferritin was elevated at 422.9. Phosphatidylserine Antibodies showed Antiphosphatidylserine IgM elevated at 44.    · Repeat right leg venous Doppler study performed on 03/11/2019 showed deep venous reflux in the right lower extremity, no evidence of DVT, and mild chronic short saphenous vein thrombus.      2. Lymphadenopathy in bilateral groin area and also left neck. This patient also has drenching night sweats and low fever. Highly suspect this is lymphoma.     · We obtained CT scan for neck chest abdomen pelvis with IV contrast obtained 1/31/2019.  The study reported shotty lymphadenopathy in the neck, left axilla but otherwise no apparent lymphadenopathy.  I independently reviewed CT scan images and agreed with the report.     · Will arrange for repeat CT chest, abdomen, pelvis to be done in 1 week.     3. Iron deficiency secondary to poor iron absorption due to gastric bypass for weight control proprius. This patient has gastric bypass in early 05/2018 and was not able to tolerate oral iron treatment with significant nausea and abdomen cramping. Laboratory study shows persistent mild anemia and her iron study on 1/28/2019 showed evidence of iron deficiency with ferritin at 22.6.     · Patient was started on Injectafer on 2/8/2019.  Patient received second dose on 2/15/2019.  · I discussed with the patient 2/15/2019 she eventually become iron deficient again because of poor iron absorption.  I recommended  monitoring her iron study every 6 months for ferritin and iron profile together with CBC check.  We will obtain iron study in 1 month to set up a post iron infusion new baseline.     · Iron study showed excellent ferritin and normal iron sats on  08/14/2019.     4. Vitamin B12. Patient reports she has been on B12 injection weekly since her gastric bypass. She is receiving treatment from her primary care physician’s office.      5.  Pulmonary nodule, possibly resolving granulomatous disease on chest CT 1/31/2019.  Radiologist recommended CT scan in 3-6 months for reevaluation.     · Will arrange for repeat CT chest to be done in 1 week.     6.  Probable hemorrhagic right ovarian cyst on CT scan.  Patient is asymptomatic.     PLAN:   1. Patient to continue on oral B-complex, Vitamin C, Vitamin D-3 and Calcium supplementation.  2. Will arrange for patient to have CT chest, abdomen, and pelvis to be done next week. If results are abnormal, I will contact the patient to arrange an appointment.   3. Continue to follow up with PCP for management of weekly B12 injections.   4. Follow-up with me in 6 months with labs - CBC, iron profile, ferritin      I, Hetal Loyd, acted as scribe for Lilo Cary MD PhD  in documenting the service or procedure. To the best of my knowledge, I recorded what was dictated by Lilo Cary MD PhD      I reviewed the note scribed by Ms. Hetal Loyd and made appropriate corrections.       Lilo Cary MD PhD  08/14/2019    CC:   MD Conner Alvarez M.D., APRN

## 2019-08-21 ENCOUNTER — TELEPHONE (OUTPATIENT)
Dept: GENERAL RADIOLOGY | Facility: HOSPITAL | Age: 34
End: 2019-08-21

## 2019-08-21 NOTE — TELEPHONE ENCOUNTER
----- Message from Anastasia Singh sent at 8/21/2019  3:56 PM EDT -----  Regarding: please rescheduled CT scans  Hello,    Can someone please reschedule CT Chest, AP that are scheduled for tomorrow 08/22/2019.    Case is pending due to patient having another authorization on file for same CT scans ordered by another MD.  I need to get these results first in order to obtain a determination.    Please move patient out to next week if all possible.  Monday 08/26/19 is okay I would hope we will have this resolved by this Friday.      Thank you

## 2019-08-22 ENCOUNTER — APPOINTMENT (OUTPATIENT)
Dept: PET IMAGING | Facility: HOSPITAL | Age: 34
End: 2019-08-22

## 2020-07-06 RX ORDER — APIXABAN 2.5 MG/1
TABLET, FILM COATED ORAL
Qty: 60 TABLET | Refills: 11 | OUTPATIENT
Start: 2020-07-06

## 2020-07-20 RX ORDER — APIXABAN 2.5 MG/1
TABLET, FILM COATED ORAL
Qty: 60 TABLET | Refills: 11 | OUTPATIENT
Start: 2020-07-20

## 2020-11-03 ENCOUNTER — TELEPHONE (OUTPATIENT)
Dept: ONCOLOGY | Facility: CLINIC | Age: 35
End: 2020-11-03

## 2020-11-03 ENCOUNTER — TELEPHONE (OUTPATIENT)
Dept: ONCOLOGY | Facility: HOSPITAL | Age: 35
End: 2020-11-03

## 2020-11-03 NOTE — TELEPHONE ENCOUNTER
----- Message from Alison Rogers RN sent at 11/3/2020 10:03 AM EST -----  Please reschedule pt's missed f/u with Dr. Cary in August for first available.

## 2020-11-03 NOTE — TELEPHONE ENCOUNTER
Dr. Cary requested I call pt after he received a clearance letter from [facility] about an upcoming EGD and holding her Eliquis for this. He states that he d/c'd her Eliquis over a year ago. Called pt and she said she was never told to stop it so she has been taking it. Discussed again with Dr. Cary and he said he needs to see her for a f/u ASAP but that she can stop taking her Eliquis now. Informed pt and she v/u. Message sent to scheduling and fax sent to Gastroenterology at [facility].